# Patient Record
Sex: MALE | Race: WHITE | NOT HISPANIC OR LATINO | Employment: OTHER | URBAN - NONMETROPOLITAN AREA
[De-identification: names, ages, dates, MRNs, and addresses within clinical notes are randomized per-mention and may not be internally consistent; named-entity substitution may affect disease eponyms.]

---

## 2017-03-30 ENCOUNTER — APPOINTMENT (OUTPATIENT)
Dept: LAB | Facility: HOSPITAL | Age: 62
End: 2017-03-30
Attending: INTERNAL MEDICINE

## 2017-03-30 ENCOUNTER — TRANSCRIBE ORDERS (OUTPATIENT)
Dept: ADMINISTRATIVE | Facility: HOSPITAL | Age: 62
End: 2017-03-30

## 2017-03-30 DIAGNOSIS — M25.561 CHRONIC ARTHRALGIAS OF KNEES AND HIPS: ICD-10-CM

## 2017-03-30 DIAGNOSIS — G89.29 CHRONIC ARTHRALGIAS OF KNEES AND HIPS: ICD-10-CM

## 2017-03-30 DIAGNOSIS — R53.83 FATIGUE, UNSPECIFIED TYPE: Primary | ICD-10-CM

## 2017-03-30 DIAGNOSIS — M25.551 CHRONIC ARTHRALGIAS OF KNEES AND HIPS: ICD-10-CM

## 2017-03-30 DIAGNOSIS — M25.552 CHRONIC ARTHRALGIAS OF KNEES AND HIPS: ICD-10-CM

## 2017-03-30 DIAGNOSIS — M25.562 CHRONIC ARTHRALGIAS OF KNEES AND HIPS: ICD-10-CM

## 2017-03-30 LAB
ALBUMIN SERPL-MCNC: 4 G/DL (ref 3.5–5)
ALBUMIN/GLOB SERPL: 1.2 G/DL (ref 1.1–2.5)
ALP SERPL-CCNC: 53 U/L (ref 24–120)
ALT SERPL W P-5'-P-CCNC: 54 U/L (ref 0–54)
ANION GAP SERPL CALCULATED.3IONS-SCNC: 12 MMOL/L (ref 4–13)
AST SERPL-CCNC: 44 U/L (ref 7–45)
BILIRUB SERPL-MCNC: 0.5 MG/DL (ref 0.1–1)
BUN BLD-MCNC: 19 MG/DL (ref 5–21)
BUN/CREAT SERPL: 21.1 (ref 7–25)
CALCIUM SPEC-SCNC: 8.8 MG/DL (ref 8.4–10.4)
CHLORIDE SERPL-SCNC: 102 MMOL/L (ref 98–110)
CHROMATIN AB SERPL-ACNC: <8.6 IU/ML (ref 0–11.9)
CK SERPL-CCNC: 163 U/L (ref 0–203)
CO2 SERPL-SCNC: 27 MMOL/L (ref 24–31)
CREAT BLD-MCNC: 0.9 MG/DL (ref 0.5–1.4)
CRP SERPL-MCNC: 0.74 MG/DL (ref 0–0.99)
DEPRECATED RDW RBC AUTO: 45.1 FL (ref 40–54)
ERYTHROCYTE [DISTWIDTH] IN BLOOD BY AUTOMATED COUNT: 13.8 % (ref 12–15)
ERYTHROCYTE [SEDIMENTATION RATE] IN BLOOD: 25 MM/HR (ref 0–15)
FERRITIN SERPL-MCNC: 103 NG/ML (ref 17.9–464)
GFR SERPL CREATININE-BSD FRML MDRD: 85 ML/MIN/1.73
GLOBULIN UR ELPH-MCNC: 3.3 GM/DL
GLUCOSE BLD-MCNC: 89 MG/DL (ref 70–100)
HBV SURFACE AG SERPL QL IA: NEGATIVE
HCT VFR BLD AUTO: 39.1 % (ref 40–52)
HCV AB SER DONR QL: NEGATIVE
HCV S/C RATIO: 0.16 (ref 0–0.99)
HGB BLD-MCNC: 13.4 G/DL (ref 14–18)
HIV1+2 AB SER QL: NEGATIVE
MCH RBC QN AUTO: 30.6 PG (ref 28–32)
MCHC RBC AUTO-ENTMCNC: 34.3 G/DL (ref 33–36)
MCV RBC AUTO: 89.3 FL (ref 82–95)
PLATELET # BLD AUTO: 181 10*3/MM3 (ref 130–400)
PMV BLD AUTO: 13.3 FL (ref 6–12)
POTASSIUM BLD-SCNC: 3.8 MMOL/L (ref 3.5–5.3)
PROT SERPL-MCNC: 7.3 G/DL (ref 6.3–8.7)
RBC # BLD AUTO: 4.38 10*6/MM3 (ref 4.8–5.9)
SODIUM BLD-SCNC: 141 MMOL/L (ref 135–145)
WBC NRBC COR # BLD: 11.01 10*3/MM3 (ref 4.8–10.8)

## 2017-03-30 PROCEDURE — 86431 RHEUMATOID FACTOR QUANT: CPT | Performed by: INTERNAL MEDICINE

## 2017-03-30 PROCEDURE — 86140 C-REACTIVE PROTEIN: CPT | Performed by: INTERNAL MEDICINE

## 2017-03-30 PROCEDURE — 86803 HEPATITIS C AB TEST: CPT | Performed by: INTERNAL MEDICINE

## 2017-03-30 PROCEDURE — 80053 COMPREHEN METABOLIC PANEL: CPT | Performed by: INTERNAL MEDICINE

## 2017-03-30 PROCEDURE — 86618 LYME DISEASE ANTIBODY: CPT | Performed by: INTERNAL MEDICINE

## 2017-03-30 PROCEDURE — 36415 COLL VENOUS BLD VENIPUNCTURE: CPT | Performed by: INTERNAL MEDICINE

## 2017-03-30 PROCEDURE — 82728 ASSAY OF FERRITIN: CPT | Performed by: INTERNAL MEDICINE

## 2017-03-30 PROCEDURE — 82550 ASSAY OF CK (CPK): CPT | Performed by: INTERNAL MEDICINE

## 2017-03-30 PROCEDURE — 87340 HEPATITIS B SURFACE AG IA: CPT | Performed by: INTERNAL MEDICINE

## 2017-03-30 PROCEDURE — 85027 COMPLETE CBC AUTOMATED: CPT | Performed by: INTERNAL MEDICINE

## 2017-03-30 PROCEDURE — G0432 EIA HIV-1/HIV-2 SCREEN: HCPCS | Performed by: INTERNAL MEDICINE

## 2017-03-30 PROCEDURE — 85651 RBC SED RATE NONAUTOMATED: CPT | Performed by: INTERNAL MEDICINE

## 2017-03-31 LAB — B BURGDOR IGG+IGM SER-ACNC: <0.91 ISR (ref 0–0.9)

## 2017-08-04 ENCOUNTER — TRANSCRIBE ORDERS (OUTPATIENT)
Dept: ADMINISTRATIVE | Facility: HOSPITAL | Age: 62
End: 2017-08-04

## 2017-08-04 DIAGNOSIS — E11.9 DIABETES MELLITUS WITHOUT COMPLICATION (HCC): Primary | ICD-10-CM

## 2017-08-23 ENCOUNTER — LAB (OUTPATIENT)
Dept: ONCOLOGY | Facility: CLINIC | Age: 62
End: 2017-08-23

## 2017-08-23 ENCOUNTER — APPOINTMENT (OUTPATIENT)
Dept: LAB | Facility: HOSPITAL | Age: 62
End: 2017-08-23

## 2017-08-23 ENCOUNTER — CONSULT (OUTPATIENT)
Dept: ONCOLOGY | Facility: CLINIC | Age: 62
End: 2017-08-23

## 2017-08-23 ENCOUNTER — HOSPITAL ENCOUNTER (OUTPATIENT)
Dept: CT IMAGING | Facility: HOSPITAL | Age: 62
Discharge: HOME | End: 2017-08-23
Attending: INTERNAL MEDICINE | Admitting: INTERNAL MEDICINE

## 2017-08-23 ENCOUNTER — TELEPHONE (OUTPATIENT)
Dept: ONCOLOGY | Facility: CLINIC | Age: 62
End: 2017-08-23

## 2017-08-23 VITALS
HEIGHT: 70 IN | RESPIRATION RATE: 18 BRPM | DIASTOLIC BLOOD PRESSURE: 92 MMHG | HEART RATE: 95 BPM | SYSTOLIC BLOOD PRESSURE: 130 MMHG | BODY MASS INDEX: 36.79 KG/M2 | TEMPERATURE: 97.1 F | WEIGHT: 257 LBS | OXYGEN SATURATION: 94 %

## 2017-08-23 DIAGNOSIS — R22.1 NODULE OF NECK: ICD-10-CM

## 2017-08-23 DIAGNOSIS — D64.9 ANEMIA, UNSPECIFIED TYPE: ICD-10-CM

## 2017-08-23 DIAGNOSIS — D72.829 LEUKOCYTOSIS, UNSPECIFIED TYPE: ICD-10-CM

## 2017-08-23 DIAGNOSIS — D72.829 LEUKOCYTOSIS, UNSPECIFIED TYPE: Primary | ICD-10-CM

## 2017-08-23 LAB
ALBUMIN SERPL-MCNC: 4.5 G/DL (ref 3.5–5)
ALBUMIN/GLOB SERPL: 1.4 G/DL (ref 1.1–2.5)
ALP SERPL-CCNC: 64 U/L (ref 24–120)
ALT SERPL W P-5'-P-CCNC: 74 U/L (ref 0–54)
ANION GAP SERPL CALCULATED.3IONS-SCNC: 13 MMOL/L (ref 4–13)
AST SERPL-CCNC: 47 U/L (ref 7–45)
AUTO MIXED CELLS #: 0.6 10*3/MM3 (ref 0.1–1.5)
AUTO MIXED CELLS %: 5.4 % (ref 0.2–15.1)
BILIRUB SERPL-MCNC: 0.8 MG/DL (ref 0.1–1)
BUN BLD-MCNC: 17 MG/DL (ref 5–21)
BUN/CREAT SERPL: 16.3 (ref 7–25)
CALCIUM SPEC-SCNC: 9.5 MG/DL (ref 8.4–10.4)
CHLORIDE SERPL-SCNC: 100 MMOL/L (ref 98–110)
CO2 SERPL-SCNC: 25 MMOL/L (ref 24–31)
CREAT BLD-MCNC: 1.04 MG/DL (ref 0.5–1.4)
CREAT BLDA-MCNC: 1.1 MG/DL (ref 0.6–1.3)
CYTOLOGIST CVX/VAG CYTO: NORMAL
ERYTHROCYTE [DISTWIDTH] IN BLOOD BY AUTOMATED COUNT: 14.4 % (ref 11.5–14.5)
GFR SERPL CREATININE-BSD FRML MDRD: 72 ML/MIN/1.73
GLOBULIN UR ELPH-MCNC: 3.3 GM/DL
GLUCOSE BLD-MCNC: 321 MG/DL (ref 70–100)
HCT VFR BLD AUTO: 45.2 % (ref 42–52)
HGB BLD-MCNC: 15.2 G/DL (ref 14–18)
LYMPHOCYTES # BLD AUTO: 2.7 10*3/MM3 (ref 0.8–7)
LYMPHOCYTES NFR BLD AUTO: 25.6 % (ref 10–58.5)
MCH RBC QN AUTO: 31.5 PG (ref 27–31)
MCHC RBC AUTO-ENTMCNC: 33.6 G/DL (ref 33–37)
MCV RBC AUTO: 93.6 FL (ref 80–94)
NEUTROPHILS # BLD AUTO: 7.2 10*3/MM3 (ref 2–7.8)
NEUTROPHILS NFR BLD AUTO: 69 % (ref 37–92)
PATH INTERP BLD-IMP: NORMAL
PLATELET # BLD AUTO: 181 10*3/MM3 (ref 130–400)
PMV BLD AUTO: 0 FL (ref 6–12)
POTASSIUM BLD-SCNC: 3.8 MMOL/L (ref 3.5–5.3)
PROT SERPL-MCNC: 7.8 G/DL (ref 6.3–8.7)
RBC # BLD AUTO: 4.83 10*6/MM3 (ref 4.7–6.1)
SODIUM BLD-SCNC: 138 MMOL/L (ref 135–145)
WBC NRBC COR # BLD: 10.5 10*3/MM3 (ref 4.8–10.8)

## 2017-08-23 PROCEDURE — 80053 COMPREHEN METABOLIC PANEL: CPT | Performed by: INTERNAL MEDICINE

## 2017-08-23 PROCEDURE — 99204 OFFICE O/P NEW MOD 45 MIN: CPT | Performed by: INTERNAL MEDICINE

## 2017-08-23 PROCEDURE — 85025 COMPLETE CBC W/AUTO DIFF WBC: CPT | Performed by: INTERNAL MEDICINE

## 2017-08-23 PROCEDURE — 36415 COLL VENOUS BLD VENIPUNCTURE: CPT

## 2017-08-23 PROCEDURE — 70491 CT SOFT TISSUE NECK W/DYE: CPT

## 2017-08-23 PROCEDURE — 0 IOPAMIDOL 61 % SOLUTION: Performed by: INTERNAL MEDICINE

## 2017-08-23 PROCEDURE — 36415 COLL VENOUS BLD VENIPUNCTURE: CPT | Performed by: INTERNAL MEDICINE

## 2017-08-23 PROCEDURE — 82565 ASSAY OF CREATININE: CPT

## 2017-08-23 PROCEDURE — 85060 BLOOD SMEAR INTERPRETATION: CPT | Performed by: INTERNAL MEDICINE

## 2017-08-23 RX ORDER — INDAPAMIDE 1.25 MG/1
1.25 TABLET, FILM COATED ORAL EVERY MORNING
COMMUNITY
End: 2018-02-17 | Stop reason: HOSPADM

## 2017-08-23 RX ORDER — LISINOPRIL 40 MG/1
40 TABLET ORAL DAILY
Status: ON HOLD | COMMUNITY
End: 2018-02-17

## 2017-08-23 RX ORDER — SERTRALINE HYDROCHLORIDE 100 MG/1
100 TABLET, FILM COATED ORAL DAILY
COMMUNITY

## 2017-08-23 RX ORDER — GABAPENTIN 300 MG/1
300 CAPSULE ORAL 3 TIMES DAILY
COMMUNITY
End: 2022-04-26

## 2017-08-23 RX ORDER — LEVOTHYROXINE SODIUM 0.07 MG/1
75 TABLET ORAL DAILY
COMMUNITY

## 2017-08-23 RX ORDER — ATORVASTATIN CALCIUM 20 MG/1
20 TABLET, FILM COATED ORAL DAILY
COMMUNITY

## 2017-08-23 RX ADMIN — IOPAMIDOL 100 ML: 612 INJECTION, SOLUTION INTRAVENOUS at 13:15

## 2017-08-23 NOTE — TELEPHONE ENCOUNTER
Notified patient that liver enzymes are elevated and Dr. Prieto does not want him taking any nsaids or drinking alcohol at this time and her needs to see his PCP about his elevated glucose.  Patient verbalized understanding.

## 2017-08-23 NOTE — PROGRESS NOTES
[facility]  HEMATOLOGY & ONCOLOGY        Subjective     VISIT DIAGNOSIS:   Encounter Diagnoses   Name Primary?   • Leukocytosis, unspecified type Yes   • Nodule of neck        REASON FOR VISIT:     Chief Complaint   Patient presents with   • Leukocytosis     eval and treat        HEMATOLOGY / ONCOLOGY HISTORY:    No history exists.     [No treatment plan]  Cancer Staging Information:  No matching staging information was found for the patient.      INTERVAL HISTORY  Patient ID: Malvin Duggan is a 61 y.o. year old male         Review of Systems   Constitutional: Positive for chills, diaphoresis and fatigue. Negative for activity change, appetite change, fever and unexpected weight change.   HENT: Negative.    Eyes: Negative.    Respiratory: Negative.    Cardiovascular: Negative.    Gastrointestinal: Negative.    Endocrine: Negative.    Genitourinary: Negative.    Musculoskeletal: Negative.    Skin: Negative.    Allergic/Immunologic: Negative.    Neurological: Negative.    Hematological: Negative.    Psychiatric/Behavioral: Negative.             Medications:    Current Outpatient Prescriptions   Medication Sig Dispense Refill   • atorvastatin (LIPITOR) 20 MG tablet Take 20 mg by mouth Daily.     • gabapentin (NEURONTIN) 300 MG capsule Take 300 mg by mouth 3 (Three) Times a Day.     • indapamide (LOZOL) 1.25 MG tablet Take 1.25 mg by mouth Every Morning.     • levothyroxine (SYNTHROID, LEVOTHROID) 75 MCG tablet Take 75 mcg by mouth Daily.     • lisinopril (PRINIVIL,ZESTRIL) 40 MG tablet Take 40 mg by mouth Daily.     • metFORMIN (GLUCOPHAGE) 500 MG tablet Take 500 mg by mouth 3 (Three) Times a Day.     • sertraline (ZOLOFT) 100 MG tablet Take 100 mg by mouth Daily.       No current facility-administered medications for this visit.        ALLERGIES:    Allergies   Allergen Reactions   • Doxycycline Anaphylaxis       Objective      Vitals:    08/23/17 1103   BP: 130/92   Pulse: 95   Resp: 18   Temp:  97.1 °F (36.2 °C)   SpO2: 94%       Current Status 8/23/2017   ECOG score 0       General Appearance: Patient is awake, alert, oriented and in no acute distress. Patient is welldeveloped, wellnourished, and appears stated age.  HEENT: Normocephalic. Sclerae clear, conjunctiva pink, extraocular movements intact, pupils, round, reactive to light and  accommodation. Mouth and throat are clear with moist oral mucosa.  NECK: 1x1 cm nodule para midline left, soft, , no jugular venous distention, thyroid not enlarged.  LYMPH: + cervical,No  supraclavicular, axillary, or inguinal lymphadenopathy.  CHEST: Equal bilateral expansion, AP  diameter normal, resonant percussion note  LUNGS: Good air movement, no rales, rhonchi, rubs or wheezes with auscultation  CARDIO: Regular sinus rhythm, no murmurs, gallops or rubs.  ABDOMEN:Obese, Nondistended, soft, No tenderness, no guarding, no rebound, No hepatosplenomegaly. No abdominal masses. Bowel sounds positive. No hernia  GENITALIA: Not examined.  BREASTS: Not examined.  MUSKEL: No joint swelling, decreased motion, or inflammation  EXTREMS: No edema, clubbing, cyanosis, No varicose veins.  NEURO: Grossly nonfocal, Gait is coordinated and smooth, Cognition is preserved.  SKIN: No rashes, no ecchymoses, no petechia.  PSYCH: Oriented to time, place and person. Memory is preserved. Mood and affect appear normal      RECENT LABS:  Lab on 08/23/2017   Component Date Value Ref Range Status   • WBC 08/23/2017 10.50  4.80 - 10.80 10*3/mm3 Final   • RBC 08/23/2017 4.83  4.70 - 6.10 10*6/mm3 Final   • Hemoglobin 08/23/2017 15.2  14.0 - 18.0 g/dL Final   • Hematocrit 08/23/2017 45.2  42.0 - 52.0 % Final   • MCV 08/23/2017 93.6  80.0 - 94.0 fL Final   • MCH 08/23/2017 31.5* 27.0 - 31.0 pg Final   • MCHC 08/23/2017 33.6  33.0 - 37.0 g/dL Final   • RDW 08/23/2017 14.4  11.5 - 14.5 % Final   • MPV 08/23/2017 0.0* 6.0 - 12.0 fL Final   • Platelets 08/23/2017 181  130 - 400 10*3/mm3 Final   •  Neutrophil % 08/23/2017 69.0  37.0 - 92.0 % Final   • Lymphocyte % 08/23/2017 25.6  10.0 - 58.5 % Final   • Auto Mixed Cells % 08/23/2017 5.4  0.2 - 15.1 % Final   • Neutrophils, Absolute 08/23/2017 7.20  2.00 - 7.80 10*3/mm3 Final   • Lymphocytes, Absolute 08/23/2017 2.70  0.80 - 7.00 10*3/mm3 Final   • Auto Mixed Cells # 08/23/2017 0.60  0.10 - 1.50 10*3/mm3 Final       RADIOLOGY:  No results found.         Assessment/Plan 60-year-old male noted to have leukocytosis since April 2017, on exam noted the presence of cervical lymphadenopathy.  Combination leukocytosis with cervical lymphadenopathy is concerning. I am going to do extensive workup to figure out the cause of the leukocytosis.     We will recheck CBC, check peripheral smear, check flow cytometry, check B12, folate, iron panel, ferritin, obtain CT of the neck with and without contrast.  We will follow with patient in 2 weeks to review.    Time Spent: 60 minutes; greater than  50% of time was spent in patient counseling and care coordination.     Yi Prieto MD    8/23/2017    11:44 AM

## 2017-08-24 LAB
FERRITIN SERPL-MCNC: 99.3 NG/ML (ref 17.9–464)
FOLATE SERPL-MCNC: 10.4 NG/ML
IRON SATN MFR SERPL: 28 % (ref 20–45)
IRON SERPL-MCNC: 95 MCG/DL (ref 42–180)
TIBC SERPL-MCNC: 334 MCG/DL (ref 225–420)
UIBC SERPL-MCNC: 239 MCG/DL
VIT B12 SERPL-MCNC: 579 PG/ML (ref 239–931)

## 2017-08-25 LAB — COPPER SERPL-MCNC: 106 UG/DL (ref 72–166)

## 2017-08-29 ENCOUNTER — APPOINTMENT (OUTPATIENT)
Dept: NUTRITION | Facility: HOSPITAL | Age: 62
End: 2017-08-29

## 2017-09-05 DIAGNOSIS — D72.829 LEUKOCYTOSIS, UNSPECIFIED TYPE: Primary | ICD-10-CM

## 2017-09-12 ENCOUNTER — OFFICE VISIT (OUTPATIENT)
Dept: ONCOLOGY | Facility: CLINIC | Age: 62
End: 2017-09-12

## 2017-09-12 ENCOUNTER — LAB (OUTPATIENT)
Dept: LAB | Facility: HOSPITAL | Age: 62
End: 2017-09-12

## 2017-09-12 VITALS
WEIGHT: 254.1 LBS | BODY MASS INDEX: 36.38 KG/M2 | HEIGHT: 70 IN | SYSTOLIC BLOOD PRESSURE: 118 MMHG | RESPIRATION RATE: 16 BRPM | TEMPERATURE: 97.2 F | DIASTOLIC BLOOD PRESSURE: 74 MMHG | HEART RATE: 68 BPM | OXYGEN SATURATION: 97 %

## 2017-09-12 DIAGNOSIS — D72.829 LEUKOCYTOSIS, UNSPECIFIED TYPE: Primary | ICD-10-CM

## 2017-09-12 DIAGNOSIS — R22.1 NECK NODULE: Primary | ICD-10-CM

## 2017-09-12 DIAGNOSIS — D72.829 LEUKOCYTOSIS, UNSPECIFIED TYPE: ICD-10-CM

## 2017-09-12 LAB
ALBUMIN SERPL-MCNC: 4 G/DL (ref 3.5–5)
ALBUMIN/GLOB SERPL: 1.2 G/DL (ref 1.1–2.5)
ALP SERPL-CCNC: 55 U/L (ref 24–120)
ALT SERPL W P-5'-P-CCNC: 59 U/L (ref 0–54)
ANION GAP SERPL CALCULATED.3IONS-SCNC: 11 MMOL/L (ref 4–13)
AST SERPL-CCNC: 42 U/L (ref 7–45)
AUTO MIXED CELLS #: 0.6 10*3/MM3 (ref 0.1–2.6)
AUTO MIXED CELLS %: 6.4 % (ref 0.1–24)
BILIRUB SERPL-MCNC: 0.8 MG/DL (ref 0.1–1)
BUN BLD-MCNC: 19 MG/DL (ref 5–21)
BUN/CREAT SERPL: 23.2
CALCIUM SPEC-SCNC: 8.9 MG/DL (ref 8.4–10.4)
CHLORIDE SERPL-SCNC: 100 MMOL/L (ref 98–110)
CO2 SERPL-SCNC: 27 MMOL/L (ref 24–31)
CREAT BLD-MCNC: 0.82 MG/DL (ref 0.5–1.4)
ERYTHROCYTE [DISTWIDTH] IN BLOOD BY AUTOMATED COUNT: 13.8 % (ref 12–15)
GFR SERPL CREATININE-BSD FRML MDRD: 95 ML/MIN/1.73
GLOBULIN UR ELPH-MCNC: 3.3 GM/DL
GLUCOSE BLD-MCNC: 144 MG/DL (ref 70–100)
HCT VFR BLD AUTO: 39 % (ref 40–52)
HGB BLD-MCNC: 13.9 G/DL (ref 14–18)
HOLD SPECIMEN: NORMAL
LYMPHOCYTES # BLD AUTO: 2.5 10*3/MM3 (ref 0.8–7)
LYMPHOCYTES NFR BLD AUTO: 25.6 % (ref 15–45)
MCH RBC QN AUTO: 31.4 PG (ref 28–32)
MCHC RBC AUTO-ENTMCNC: 35.6 G/DL (ref 33–36)
MCV RBC AUTO: 88 FL (ref 82–95)
NEUTROPHILS # BLD AUTO: 6.8 10*3/MM3 (ref 1.5–8.3)
NEUTROPHILS NFR BLD AUTO: 68 % (ref 39–78)
PLATELET # BLD AUTO: 166 10*3/MM3 (ref 130–400)
PMV BLD AUTO: 12.3 FL (ref 6–12)
POTASSIUM BLD-SCNC: 4 MMOL/L (ref 3.5–5.3)
PROT SERPL-MCNC: 7.3 G/DL (ref 6.3–8.7)
RBC # BLD AUTO: 4.43 10*6/MM3 (ref 4.2–5.4)
SODIUM BLD-SCNC: 138 MMOL/L (ref 135–145)
WBC NRBC COR # BLD: 9.9 10*3/MM3 (ref 4.8–10.8)

## 2017-09-12 PROCEDURE — 85025 COMPLETE CBC W/AUTO DIFF WBC: CPT

## 2017-09-12 PROCEDURE — 80053 COMPREHEN METABOLIC PANEL: CPT

## 2017-09-12 PROCEDURE — 99213 OFFICE O/P EST LOW 20 MIN: CPT | Performed by: INTERNAL MEDICINE

## 2017-09-12 PROCEDURE — 36415 COLL VENOUS BLD VENIPUNCTURE: CPT

## 2017-09-12 NOTE — PROGRESS NOTES
[facility]  HEMATOLOGY & ONCOLOGY        Subjective     VISIT DIAGNOSIS:   Encounter Diagnosis   Name Primary?   • Leukocytosis, unspecified type Yes       REASON FOR VISIT:     Chief Complaint   Patient presents with   • Leukocytosis     Workup        HEMATOLOGY / ONCOLOGY HISTORY:   Oncology/Hematology History    60-year-old male referred to me for leukocytoses first noted by PCP in April.  White count was noted to be elevated at 12.0.   07/26/2017 white count was noted to be elevated at 10.9.  Hemoglobin 15.1, platelet 199 normal.    Patient denies that any lymphadenopathy.  Reports having night sweats for sure.  Last night and one time in the past.  Denies unintentional weight loss.  Denies fatigue or fever.  Denies early satiety.  Denies abdominal pain or organomegaly.  Denies use of steroids.  Denies being sick or having any infection.  Denies use of cigarettes.  Denies family history of leukemia or lymphoma.  He still has spleen.       [No treatment plan]  Cancer Staging Information:  No matching staging information was found for the patient.      INTERVAL HISTORY  Patient ID: Malvin Duggan is a 61 y.o. year old male yet to follow-up on leukocytosis.  We will obtain the flow cytometry and there is no evidence of monoclonal B-cell.  T-cell show no significant aberrant antigen expression and the CD4 CD8 ratio is normal.  Peripheral blood smear showed the neutrophilia no evidence of toxic granules or left shift no blasts are dysplastic Nevus cells noted.  Absence of schistocytes normal at 4 and platelets.  HIV tests are reactive.  He has normal iron profile, B12, folate, and ferritin.  I today's presentation history is fine no complaint whatsoever he just went on vacation.  He started exercising and eating healthier.  Also CT soft tissue neck was obtained due to some nodules felt on anterior cervical region.  It did not show any evidence of lymphadenopathy, normal thyroid.        Review of  Systems   Constitutional: Positive for chills, diaphoresis and fatigue. Negative for activity change, appetite change, fever and unexpected weight change.   HENT: Negative.    Eyes: Negative.    Respiratory: Negative.    Cardiovascular: Negative.    Gastrointestinal: Negative.    Endocrine: Negative.    Genitourinary: Negative.    Musculoskeletal: Negative.    Skin: Negative.    Allergic/Immunologic: Negative.    Neurological: Negative.    Hematological: Negative.    Psychiatric/Behavioral: Negative.             Medications:    Current Outpatient Prescriptions   Medication Sig Dispense Refill   • atorvastatin (LIPITOR) 20 MG tablet Take 20 mg by mouth Daily.     • gabapentin (NEURONTIN) 300 MG capsule Take 300 mg by mouth 3 (Three) Times a Day.     • indapamide (LOZOL) 1.25 MG tablet Take 1.25 mg by mouth Every Morning.     • levothyroxine (SYNTHROID, LEVOTHROID) 75 MCG tablet Take 75 mcg by mouth Daily.     • lisinopril (PRINIVIL,ZESTRIL) 40 MG tablet Take 40 mg by mouth Daily.     • metFORMIN (GLUCOPHAGE) 500 MG tablet Take 500 mg by mouth 3 (Three) Times a Day.     • sertraline (ZOLOFT) 100 MG tablet Take 100 mg by mouth Daily.       No current facility-administered medications for this visit.        ALLERGIES:    Allergies   Allergen Reactions   • Doxycycline Anaphylaxis and Swelling       Objective      Vitals:    09/12/17 0830   BP: 118/74   Pulse: 68   Resp: 16   Temp: 97.2 °F (36.2 °C)   SpO2: 97%       Current Status 9/12/2017   ECOG score 0       General Appearance: Patient is awake, alert, oriented and in no acute distress. Patient is welldeveloped, wellnourished, and appears stated age.  HEENT: Normocephalic. Sclerae clear, conjunctiva pink, extraocular movements intact, pupils, round, reactive to light and  accommodation. Mouth and throat are clear with moist oral mucosa.  NECK: 1x1 cm nodule para midline left, soft, , no jugular venous distention, thyroid not enlarged.  LYMPH: + cervical,No   supraclavicular, axillary, or inguinal lymphadenopathy.  CHEST: Equal bilateral expansion, AP  diameter normal, resonant percussion note  LUNGS: Good air movement, no rales, rhonchi, rubs or wheezes with auscultation  CARDIO: Regular sinus rhythm, no murmurs, gallops or rubs.  ABDOMEN:Obese, Nondistended, soft, No tenderness, no guarding, no rebound, No hepatosplenomegaly. No abdominal masses. Bowel sounds positive. No hernia  GENITALIA: Not examined.  BREASTS: Not examined.  MUSKEL: No joint swelling, decreased motion, or inflammation  EXTREMS: No edema, clubbing, cyanosis, No varicose veins.  NEURO: Grossly nonfocal, Gait is coordinated and smooth, Cognition is preserved.  SKIN: No rashes, no ecchymoses, no petechia.  PSYCH: Oriented to time, place and person. Memory is preserved. Mood and affect appear normal      RECENT LABS:  Lab on 09/12/2017   Component Date Value Ref Range Status   • Glucose 09/12/2017 144* 70 - 100 mg/dL Final   • BUN 09/12/2017 19  5 - 21 mg/dL Final   • Creatinine 09/12/2017 0.82  0.50 - 1.40 mg/dL Final   • Sodium 09/12/2017 138  135 - 145 mmol/L Final   • Potassium 09/12/2017 4.0  3.5 - 5.3 mmol/L Final   • Chloride 09/12/2017 100  98 - 110 mmol/L Final   • CO2 09/12/2017 27.0  24.0 - 31.0 mmol/L Final   • Calcium 09/12/2017 8.9  8.4 - 10.4 mg/dL Final   • Total Protein 09/12/2017 7.3  6.3 - 8.7 g/dL Final   • Albumin 09/12/2017 4.00  3.50 - 5.00 g/dL Final   • ALT (SGPT) 09/12/2017 59* 0 - 54 U/L Final   • AST (SGOT) 09/12/2017 42  7 - 45 U/L Final   • Alkaline Phosphatase 09/12/2017 55  24 - 120 U/L Final   • Total Bilirubin 09/12/2017 0.8  0.1 - 1.0 mg/dL Final   • eGFR Non African Amer 09/12/2017 95  >60 mL/min/1.73 Final   • Globulin 09/12/2017 3.3  gm/dL Final   • A/G Ratio 09/12/2017 1.2  1.1 - 2.5 g/dL Final   • BUN/Creatinine Ratio 09/12/2017 23.2    Final   • Anion Gap 09/12/2017 11.0  4.0 - 13.0 mmol/L Final   • WBC 09/12/2017 9.90  4.80 - 10.80 10*3/mm3 Final   • RBC  09/12/2017 4.43  4.20 - 5.40 10*6/mm3 Final   • Hemoglobin 09/12/2017 13.9* 14.0 - 18.0 g/dL Final   • Hematocrit 09/12/2017 39.0* 40.0 - 52.0 % Final   • MCV 09/12/2017 88.0  82.0 - 95.0 fL Final   • MCH 09/12/2017 31.4  28.0 - 32.0 pg Final   • MCHC 09/12/2017 35.6  33.0 - 36.0 g/dL Final   • RDW 09/12/2017 13.8  12.0 - 15.0 % Final   • MPV 09/12/2017 12.3* 6.0 - 12.0 fL Final   • Platelets 09/12/2017 166  130 - 400 10*3/mm3 Final   • Neutrophil % 09/12/2017 68.0  39.0 - 78.0 % Final   • Lymphocyte % 09/12/2017 25.6  15.0 - 45.0 % Final   • Auto Mixed Cells % 09/12/2017 6.4  0.1 - 24.0 % Final   • Neutrophils, Absolute 09/12/2017 6.80  1.50 - 8.30 10*3/mm3 Final   • Lymphocytes, Absolute 09/12/2017 2.50  0.80 - 7.00 10*3/mm3 Final   • Auto Mixed Cells # 09/12/2017 0.60  0.10 - 2.60 10*3/mm3 Final   • Extra Tube 09/12/2017 Hold for add-ons.   Final    Auto resulted.       RADIOLOGY:  Ct Soft Tissue Neck With Contrast    Result Date: 8/23/2017  Narrative: EXAMINATION:  CT SOFT TISSUE NECK W CONTRAST-  8/23/2017 12:41 PM CDT  HISTORY: Patient with leukocytosis and a 1x1 cm nodule on ventral neck. D72.829-Elevated white blood cell count, unspecified; R22.1-Localized swelling, mass and lump, neck.  COMPARISON: No comparison study.  TECHNIQUE: Spiral CT was performed of the neck with contrast. Sagittal and coronal images were reconstructed. DLP: 235 mGy-cm.  FINDINGS: The visualized lungs are clear. There is a BB marker on the right neck at the level of the upper aspect of the thyroid gland. There is normal-appearing sternocleidomastoid muscle deep to the marker. There are no other masses or lymph nodes in this area. The thyroid gland demonstrates no nodularity. The parotid and submandibular glands are symmetric. The nasopharynx, oropharynx hypopharynx and larynx demonstrate no soft tissue asymmetry or mass effect. The visualized brain is unremarkable. The visualized paranasal sinuses are clear. The globes, optic nerves  and ocular muscles are symmetric. There are degenerative changes of the cervical spine at multiple levels and most severe at see 5-6 and C6-7. 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      Impression: 1. There is no soft tissue nodule or lymph node at the palpable location on the right neck. In this area, there is a normal-appearing sternocleidomastoid muscle. The right thyroid lobe is nearby and there are no thyroid nodules identified. 2. No other acute findings. 3. Degenerative changes of the cervical spine. This report was finalized on 08/23/2017 13:05 by Dr. Adryan Callahan MD.           Assessment/Plan  60-year-old male noted to have leukocytosis since April 2017.    1.  Leukocytosis: Workup so far unrevealing, white count down to normal suspect this could be either some transient reactive process or due to obesity.  Patient has changed his lifestyle, he started exercising and eating healthy.  Reemphasized the importance of exercise and healthy eating with his history of diabetes.    2.  Transaminitis: Improved.  Patient advised to follow with his PCP.     At this point no hematologic intervention I will have him follow with PCP. He will be referred back to see us if things change at that point we could consider bone marrow biopsy.      Yi Prieto MD    9/12/2017    11:33 AM    Physical Exam

## 2018-02-15 ENCOUNTER — APPOINTMENT (OUTPATIENT)
Dept: CT IMAGING | Facility: HOSPITAL | Age: 63
End: 2018-02-15

## 2018-02-15 ENCOUNTER — HOSPITAL ENCOUNTER (INPATIENT)
Facility: HOSPITAL | Age: 63
LOS: 2 days | Discharge: HOME | End: 2018-02-17
Attending: EMERGENCY MEDICINE | Admitting: INTERNAL MEDICINE

## 2018-02-15 DIAGNOSIS — E66.9 DIABETES MELLITUS TYPE 2 IN OBESE (HCC): ICD-10-CM

## 2018-02-15 DIAGNOSIS — E11.69 DIABETES MELLITUS TYPE 2 IN OBESE (HCC): ICD-10-CM

## 2018-02-15 DIAGNOSIS — N17.9 ACUTE KIDNEY INJURY (NONTRAUMATIC) (HCC): Primary | ICD-10-CM

## 2018-02-15 LAB
ANION GAP SERPL CALCULATED.3IONS-SCNC: 17 MMOL/L (ref 4–13)
BACTERIA UR QL AUTO: ABNORMAL /HPF
BILIRUB UR QL STRIP: NEGATIVE
BUN BLD-MCNC: 50 MG/DL (ref 5–21)
BUN/CREAT SERPL: 13.9 (ref 7–25)
CALCIUM SPEC-SCNC: 9 MG/DL (ref 8.4–10.4)
CHLORIDE SERPL-SCNC: 97 MMOL/L (ref 98–110)
CLARITY UR: ABNORMAL
CO2 SERPL-SCNC: 25 MMOL/L (ref 24–31)
COD CRY URNS QL: ABNORMAL /HPF
COLOR UR: YELLOW
CREAT BLD-MCNC: 3.59 MG/DL (ref 0.5–1.4)
CREAT UR-MCNC: 96.1 MG/DL
DEPRECATED RDW RBC AUTO: 45.1 FL (ref 40–54)
EOSINOPHIL # BLD MANUAL: 0.13 10*3/MM3 (ref 0–0.7)
EOSINOPHIL NFR BLD MANUAL: 1 % (ref 0–4)
ERYTHROCYTE [DISTWIDTH] IN BLOOD BY AUTOMATED COUNT: 13.9 % (ref 12–15)
GFR SERPL CREATININE-BSD FRML MDRD: 17 ML/MIN/1.73
GLUCOSE BLD-MCNC: 113 MG/DL (ref 70–100)
GLUCOSE BLDC GLUCOMTR-MCNC: 103 MG/DL (ref 70–130)
GLUCOSE BLDC GLUCOMTR-MCNC: 182 MG/DL (ref 70–130)
GLUCOSE UR STRIP-MCNC: ABNORMAL MG/DL
HCT VFR BLD AUTO: 37.8 % (ref 40–52)
HGB BLD-MCNC: 12.7 G/DL (ref 14–18)
HGB UR QL STRIP.AUTO: NEGATIVE
HOLD SPECIMEN: NORMAL
HYALINE CASTS UR QL AUTO: ABNORMAL /LPF
KETONES UR QL STRIP: ABNORMAL
LEUKOCYTE ESTERASE UR QL STRIP.AUTO: ABNORMAL
LYMPHOCYTES # BLD MANUAL: 3.12 10*3/MM3 (ref 0.72–4.86)
LYMPHOCYTES NFR BLD MANUAL: 24 % (ref 15–45)
LYMPHOCYTES NFR BLD MANUAL: 7 % (ref 4–12)
MCH RBC QN AUTO: 30.1 PG (ref 28–32)
MCHC RBC AUTO-ENTMCNC: 33.6 G/DL (ref 33–36)
MCV RBC AUTO: 89.6 FL (ref 82–95)
MONOCYTES # BLD AUTO: 0.91 10*3/MM3 (ref 0.19–1.3)
MUCOUS THREADS URNS QL MICRO: ABNORMAL /HPF
NEUTROPHILS # BLD AUTO: 8.84 10*3/MM3 (ref 1.87–8.4)
NEUTROPHILS NFR BLD MANUAL: 68 % (ref 39–78)
NITRITE UR QL STRIP: NEGATIVE
NRBC BLD MANUAL-RTO: 0 /100 WBC (ref 0–0)
OSMOLALITY SERPL: 296 MOSM/KG (ref 289–308)
OSMOLALITY UR: 391 MOSM/KG (ref 601–850)
PH UR STRIP.AUTO: <=5 [PH] (ref 5–8)
PLATELET # BLD AUTO: 166 10*3/MM3 (ref 130–400)
PMV BLD AUTO: 12.8 FL (ref 6–12)
POTASSIUM BLD-SCNC: 4 MMOL/L (ref 3.5–5.3)
PROT UR QL STRIP: ABNORMAL
RBC # BLD AUTO: 4.22 10*6/MM3 (ref 4.8–5.9)
RBC # UR: ABNORMAL /HPF
RBC MORPH BLD: NORMAL
REF LAB TEST METHOD: ABNORMAL
SMALL PLATELETS BLD QL SMEAR: ADEQUATE
SODIUM BLD-SCNC: 139 MMOL/L (ref 135–145)
SODIUM UR-SCNC: 97 MMOL/L (ref 30–90)
SP GR UR STRIP: 1.02 (ref 1–1.03)
SQUAMOUS #/AREA URNS HPF: ABNORMAL /HPF
UROBILINOGEN UR QL STRIP: ABNORMAL
UUN 24H UR-MCNC: 387 MG/DL
WBC MORPH BLD: NORMAL
WBC NRBC COR # BLD: 13 10*3/MM3 (ref 4.8–10.8)
WBC UR QL AUTO: ABNORMAL /HPF
WHOLE BLOOD HOLD SPECIMEN: NORMAL

## 2018-02-15 PROCEDURE — 51798 US URINE CAPACITY MEASURE: CPT

## 2018-02-15 PROCEDURE — 82962 GLUCOSE BLOOD TEST: CPT

## 2018-02-15 PROCEDURE — 87086 URINE CULTURE/COLONY COUNT: CPT | Performed by: EMERGENCY MEDICINE

## 2018-02-15 PROCEDURE — 85025 COMPLETE CBC W/AUTO DIFF WBC: CPT | Performed by: EMERGENCY MEDICINE

## 2018-02-15 PROCEDURE — 74176 CT ABD & PELVIS W/O CONTRAST: CPT

## 2018-02-15 PROCEDURE — 83935 ASSAY OF URINE OSMOLALITY: CPT | Performed by: NURSE PRACTITIONER

## 2018-02-15 PROCEDURE — 81001 URINALYSIS AUTO W/SCOPE: CPT | Performed by: EMERGENCY MEDICINE

## 2018-02-15 PROCEDURE — 84540 ASSAY OF URINE/UREA-N: CPT | Performed by: NURSE PRACTITIONER

## 2018-02-15 PROCEDURE — 84300 ASSAY OF URINE SODIUM: CPT | Performed by: NURSE PRACTITIONER

## 2018-02-15 PROCEDURE — 63710000001 INSULIN LISPRO (HUMAN) PER 5 UNITS: Performed by: NURSE PRACTITIONER

## 2018-02-15 PROCEDURE — 83930 ASSAY OF BLOOD OSMOLALITY: CPT | Performed by: NURSE PRACTITIONER

## 2018-02-15 PROCEDURE — G0378 HOSPITAL OBSERVATION PER HR: HCPCS

## 2018-02-15 PROCEDURE — 99284 EMERGENCY DEPT VISIT MOD MDM: CPT

## 2018-02-15 PROCEDURE — 80048 BASIC METABOLIC PNL TOTAL CA: CPT | Performed by: EMERGENCY MEDICINE

## 2018-02-15 PROCEDURE — 82570 ASSAY OF URINE CREATININE: CPT | Performed by: NURSE PRACTITIONER

## 2018-02-15 PROCEDURE — 85007 BL SMEAR W/DIFF WBC COUNT: CPT | Performed by: EMERGENCY MEDICINE

## 2018-02-15 RX ORDER — LEVOTHYROXINE SODIUM 0.07 MG/1
75 TABLET ORAL
Status: DISCONTINUED | OUTPATIENT
Start: 2018-02-15 | End: 2018-02-17 | Stop reason: HOSPADM

## 2018-02-15 RX ORDER — FAMOTIDINE 20 MG/1
40 TABLET, FILM COATED ORAL DAILY
Status: DISCONTINUED | OUTPATIENT
Start: 2018-02-15 | End: 2018-02-17 | Stop reason: HOSPADM

## 2018-02-15 RX ORDER — ACETAMINOPHEN 325 MG/1
650 TABLET ORAL EVERY 4 HOURS PRN
Status: DISCONTINUED | OUTPATIENT
Start: 2018-02-15 | End: 2018-02-17 | Stop reason: HOSPADM

## 2018-02-15 RX ORDER — ATORVASTATIN CALCIUM 10 MG/1
20 TABLET, FILM COATED ORAL DAILY
Status: DISCONTINUED | OUTPATIENT
Start: 2018-02-16 | End: 2018-02-17 | Stop reason: HOSPADM

## 2018-02-15 RX ORDER — ONDANSETRON 2 MG/ML
4 INJECTION INTRAMUSCULAR; INTRAVENOUS EVERY 6 HOURS PRN
Status: DISCONTINUED | OUTPATIENT
Start: 2018-02-15 | End: 2018-02-17 | Stop reason: HOSPADM

## 2018-02-15 RX ORDER — ONDANSETRON 4 MG/1
4 TABLET, FILM COATED ORAL EVERY 6 HOURS PRN
Status: DISCONTINUED | OUTPATIENT
Start: 2018-02-15 | End: 2018-02-17 | Stop reason: HOSPADM

## 2018-02-15 RX ORDER — SODIUM CHLORIDE 0.9 % (FLUSH) 0.9 %
10 SYRINGE (ML) INJECTION AS NEEDED
Status: DISCONTINUED | OUTPATIENT
Start: 2018-02-15 | End: 2018-02-15

## 2018-02-15 RX ORDER — SERTRALINE HYDROCHLORIDE 100 MG/1
100 TABLET, FILM COATED ORAL DAILY
Status: DISCONTINUED | OUTPATIENT
Start: 2018-02-15 | End: 2018-02-17 | Stop reason: HOSPADM

## 2018-02-15 RX ORDER — NICOTINE POLACRILEX 4 MG
15 LOZENGE BUCCAL
Status: DISCONTINUED | OUTPATIENT
Start: 2018-02-15 | End: 2018-02-17 | Stop reason: HOSPADM

## 2018-02-15 RX ORDER — ONDANSETRON 4 MG/1
4 TABLET, ORALLY DISINTEGRATING ORAL EVERY 6 HOURS PRN
Status: DISCONTINUED | OUTPATIENT
Start: 2018-02-15 | End: 2018-02-17 | Stop reason: HOSPADM

## 2018-02-15 RX ORDER — SODIUM CHLORIDE 9 MG/ML
75 INJECTION, SOLUTION INTRAVENOUS CONTINUOUS
Status: DISCONTINUED | OUTPATIENT
Start: 2018-02-15 | End: 2018-02-17 | Stop reason: HOSPADM

## 2018-02-15 RX ORDER — ATORVASTATIN CALCIUM 10 MG/1
20 TABLET, FILM COATED ORAL NIGHTLY
Status: DISCONTINUED | OUTPATIENT
Start: 2018-02-15 | End: 2018-02-15

## 2018-02-15 RX ORDER — DEXTROSE MONOHYDRATE 25 G/50ML
25 INJECTION, SOLUTION INTRAVENOUS
Status: DISCONTINUED | OUTPATIENT
Start: 2018-02-15 | End: 2018-02-17 | Stop reason: HOSPADM

## 2018-02-15 RX ADMIN — INSULIN LISPRO 2 UNITS: 100 INJECTION, SOLUTION INTRAVENOUS; SUBCUTANEOUS at 21:05

## 2018-02-15 RX ADMIN — SODIUM CHLORIDE 150 ML/HR: 9 INJECTION, SOLUTION INTRAVENOUS at 18:44

## 2018-02-15 RX ADMIN — FAMOTIDINE 40 MG: 20 TABLET, FILM COATED ORAL at 18:43

## 2018-02-15 RX ADMIN — SODIUM CHLORIDE 1000 ML: 9 INJECTION, SOLUTION INTRAVENOUS at 15:29

## 2018-02-15 NOTE — ED PROVIDER NOTES
Subjective   HPI Comments: Patient comes in with a complaint that he has not urinated since last night in the meantime had multiple cup of coffee cups of coffee, Sprite, other carbonated drinks and water.  Patient does not feel any urge to urinate and does not complain of any abdominal distention.  Patient has some low back pain and vomited once.  Patient is complaining of feverish sensation although he does not have any fever here and has not taken any medication.  Patient called his physician and they were advised to come to the emergency department for further evaluation and treatment.  Patient denies any history of prostate problem although he is diabetic.  Patient the denies any other chronic illnesses other than the mentioned in patient's medical records    Patient is a 62 y.o. male presenting with difficulty urinating.   History provided by:  Patient and spouse  Difficulty Urinating   Presenting symptoms: no dysuria, no penile discharge, no penile pain, no scrotal pain and no swelling    Context: spontaneously    Context: not after injury, not after intercourse, not after urination, not during intercourse and not during urination    Relieved by:  Nothing  Worsened by:  Nothing  Ineffective treatments:  None tried  Associated symptoms: nausea, urinary retention and vomiting    Associated symptoms: no abdominal pain, no diarrhea, no fever, no flank pain, no genital itching, no genital lesions, no genital rash, no groin pain, no hematuria, no penile redness, no penile swelling, no priapism, no scrotal swelling, no urinary frequency, no urinary hesitation and no urinary incontinence    Risk factors: no bladder surgery, no change in medication, no erectile dysfunction, no foreign body, no HIV, no kidney stones, does not have multiple sexual partners, no new sexual partner, no recent infection, not currently sexually active, no sickle cell disease, no STI exposure, no unprotected sex and no urinary catheter         Review of Systems   Constitutional: Negative for fever.   Gastrointestinal: Positive for nausea and vomiting. Negative for abdominal pain and diarrhea.   Genitourinary: Positive for difficulty urinating. Negative for bladder incontinence, discharge, dysuria, flank pain, frequency, hematuria, hesitancy, penile pain, penile swelling and scrotal swelling.   All other systems reviewed and are negative.      Past Medical History:   Diagnosis Date   • Black lung disease    • Diabetes mellitus     Type 2   • Disease of thyroid gland    • GERD (gastroesophageal reflux disease)    • Hypertension    • Leukocytosis 8/23/2017   • Neck nodule 8/23/2017       Allergies   Allergen Reactions   • Doxycycline Anaphylaxis and Swelling       Past Surgical History:   Procedure Laterality Date   • COLON SURGERY      colon resection & polyp removed   • ELBOW ARTHROSCOPY     • SHOULDER ARTHROTOMY     • THUMB ARTHROSCOPY         History reviewed. No pertinent family history.    Social History     Social History   • Marital status:      Spouse name: N/A   • Number of children: N/A   • Years of education: N/A     Social History Main Topics   • Smoking status: Never Smoker   • Smokeless tobacco: None   • Alcohol use No   • Drug use: No   • Sexual activity: Defer     Other Topics Concern   • None     Social History Narrative           Objective   Physical Exam   Constitutional: He is oriented to person, place, and time. He appears well-developed and well-nourished. No distress.   HENT:   Head: Normocephalic and atraumatic.   Eyes: Conjunctivae are normal. Pupils are equal, round, and reactive to light.   Neck: Neck supple.   Cardiovascular: Normal rate, regular rhythm, normal heart sounds and intact distal pulses.    Pulmonary/Chest: Effort normal and breath sounds normal. No respiratory distress.   Abdominal: Soft. Bowel sounds are normal. He exhibits no distension. There is no tenderness.   Genitourinary: Penis normal.    Musculoskeletal: Normal range of motion. He exhibits no edema.   Neurological: He is alert and oriented to person, place, and time.   Skin: Skin is warm and dry. No rash noted. No erythema.   Psychiatric: He has a normal mood and affect.   Nursing note and vitals reviewed.      Procedures         ED Course  ED Course        Labs Reviewed   BASIC METABOLIC PANEL - Abnormal; Notable for the following:        Result Value    Glucose 113 (*)     BUN 50 (*)     Creatinine 3.59 (*)     Chloride 97 (*)     eGFR Non African Amer 17 (*)     Anion Gap 17.0 (*)     All other components within normal limits    Narrative:     GFR Normal >60  Chronic Kidney Disease <60  Kidney Failure <15   URINALYSIS W/ CULTURE IF INDICATED - Abnormal; Notable for the following:     Appearance, UA Cloudy (*)     Glucose, UA >=1000 mg/dL (3+) (*)     Ketones, UA Trace (*)     Protein, UA Trace (*)     Leuk Esterase, UA Trace (*)     All other components within normal limits   CBC WITH AUTO DIFFERENTIAL - Abnormal; Notable for the following:     WBC 13.00 (*)     RBC 4.22 (*)     Hemoglobin 12.7 (*)     Hematocrit 37.8 (*)     MPV 12.8 (*)     All other components within normal limits    Narrative:     ckd   URINALYSIS, MICROSCOPIC ONLY - Abnormal; Notable for the following:     WBC, UA 0-2 (*)     All other components within normal limits   MANUAL DIFFERENTIAL - Abnormal; Notable for the following:     Neutrophils Absolute 8.84 (*)     All other components within normal limits   URINE CULTURE   RAINBOW DRAW    Narrative:     The following orders were created for panel order [place] Draw.  Procedure                               Abnormality         Status                     ---------                               -----------         ------                     Light Blue Top[[id number]]                                   Final result               Red Top[[id number]]                                          Final result                 Please view  results for these tests on the individual orders.   CBC AND DIFFERENTIAL    Narrative:     The following orders were created for panel order CBC & Differential.  Procedure                               Abnormality         Status                     ---------                               -----------         ------                     Manual Differential[[id number]]                                                         CBC Auto Differential[[id number]]        Abnormal            Final result                 Please view results for these tests on the individual orders.   LIGHT BLUE TOP   RED TOP       CT Abdomen Pelvis Without Contrast   Final Result   1. No urinary tract calculi or obstructive uropathy changes.   2. No CT evidence of acute intra-abdominal/pelvic pathological process.   This report was finalized on 02/15/2018 15:55 by Dr. Amish Eastman MD.                  [facility]    Final diagnoses:   Acute kidney injury (nontraumatic)            Delfin Asher MD  02/15/18 0681

## 2018-02-15 NOTE — H&P
[facility] Medicine Services  HISTORY AND PHYSICAL    Date of Admission: 2/15/2018  Primary Care Physician: Keanu Aguiar DO    Subjective     Chief Complaint: Difficulty urinating    History of Present Illness  Mr. Duggan is a 62-year-old  male who follows Dr. Eliezer Bruner for primary care.  He has a past medical history significant for hypertension, hyperlipidemia, hypothyroidism, gastroesophageal reflux disease, and diabetes mellitus type II.  The patient states that yesterday afternoon he was feeling the urge to void but was unable to void- and prior to this the last time he was able to void was Tuesday night.  He also relates intermittent low back pain, described as aching, for the last 2-3 days.  He denies any dysuria, frequency, or urgency.  He also experienced one episode of vomiting last night.  He described his emesis as nonbloody, and nonbilious.  This was described as clear with undigested food.  He has had no difficulty with nausea or vomiting today, and has tolerated regular food.  His wife states that she did take his temperature last night, and the highest reached was 99.7.  He states that yesterday he did have a period for a few hours where he felt very fatigued, almost drowsy.  The patient states that he has never had any episodes or problems like this before.    The patient does tell me that around 3 months ago he was started on Jardiance, but that is his only recent medication change.    Review of Systems   Constitutional: Positive for fatigue and fever. Negative for appetite change and diaphoresis.   Respiratory: Negative for cough, chest tightness and shortness of breath.    Cardiovascular: Negative for chest pain, palpitations and leg swelling.   Gastrointestinal: Positive for nausea and vomiting. Negative for abdominal distention, abdominal pain, blood in stool, constipation and diarrhea.   Genitourinary: Positive for difficulty urinating.  Negative for dysuria, flank pain, frequency and urgency.   Musculoskeletal: Positive for back pain.   Neurological: Negative for dizziness, syncope, weakness, light-headedness and numbness.     Otherwise complete ROS reviewed and negative except as mentioned in the HPI.    Past Medical History:   Past Medical History:   Diagnosis Date   • Black lung disease    • Diabetes mellitus     Type 2   • Disease of thyroid gland    • GERD (gastroesophageal reflux disease)    • Hypertension    • Leukocytosis 2017   • Neck nodule 2017     Past Surgical History:  Past Surgical History:   Procedure Laterality Date   • COLON SURGERY      colon resection & polyp removed   • ELBOW ARTHROSCOPY     • SHOULDER ARTHROTOMY     • THUMB ARTHROSCOPY       Social History: The patient reports that he has never been a smoker.  He does drink alcohol, only socially, approximately 1-2 times per month.  He denies any illicit drug use.  He is , and has several adopted children.  He is a retired mine worker.    Family History: The patient reports both his mother and father are  from esophageal cancer.  He does have a sister with heart disease who is status post CABG.    Allergies:  Allergies   Allergen Reactions   • Doxycycline Anaphylaxis and Swelling     Medications:  Prior to Admission medications    Medication Sig Start Date End Date Taking? Authorizing Provider   atorvastatin (LIPITOR) 20 MG tablet Take 20 mg by mouth Daily.   Yes Historical Provider, MD   Empagliflozin (JARDIANCE) 25 MG tablet Take 25 mg by mouth Daily. 17  Yes Historical Provider, MD   gabapentin (NEURONTIN) 300 MG capsule Take 300 mg by mouth 3 (Three) Times a Day.   Yes Historical Provider, MD   indapamide (LOZOL) 1.25 MG tablet Take 1.25 mg by mouth Every Morning.   Yes Historical Provider, MD   levothyroxine (SYNTHROID, LEVOTHROID) 75 MCG tablet Take 75 mcg by mouth Daily.   Yes Historical Provider, MD   lisinopril (PRINIVIL,ZESTRIL) 40 MG  "tablet Take 40 mg by mouth Daily.   Yes Historical Provider, MD   metFORMIN (GLUCOPHAGE) 500 MG tablet Take 500 mg by mouth 3 (Three) Times a Day.   Yes Historical Provider, MD   sertraline (ZOLOFT) 100 MG tablet Take 100 mg by mouth Daily.   Yes Historical Provider, MD     Objective     Vital Signs: BP 95/59 (BP Location: Right arm, Patient Position: Lying) Comment: nurse notified  Pulse 79  Temp 98.5 °F (36.9 °C) (Oral)   Resp 16  Ht 177.8 cm (70\")  Wt 113 kg (250 lb 1.6 oz)  SpO2 94%  BMI 35.89 kg/m2  Physical Exam   Constitutional: He is oriented to person, place, and time. He appears well-developed and well-nourished. No distress.   HENT:   Head: Normocephalic and atraumatic.   Neck: Normal range of motion. Neck supple.   Cardiovascular: Normal rate, regular rhythm, normal heart sounds and intact distal pulses.  Exam reveals no gallop and no friction rub.    No murmur heard.  Pulmonary/Chest: Effort normal and breath sounds normal. He has no wheezes. He has no rales.   Abdominal: Soft. Bowel sounds are normal. He exhibits no distension. There is no tenderness.   Musculoskeletal: Normal range of motion. He exhibits no edema.   Neurological: He is alert and oriented to person, place, and time.   Skin: Skin is warm and dry.   Psychiatric: He has a normal mood and affect. His behavior is normal. Judgment and thought content normal.   Vitals reviewed.    Results Reviewed:  Lab Results (last 24 hours)     Procedure Component Value Units Date/Time    CBC & Differential [[id number]] Collected:  02/15/18 1458    Specimen:  Blood Updated:  02/15/18 1515    Narrative:       The following orders were created for panel order CBC & Differential.  Procedure                               Abnormality         Status                     ---------                               -----------         ------                     Manual Differential[[id number]]                                                         CBC Auto " Differential[[id number]]        Abnormal            Final result                 Please view results for these tests on the individual orders.    CBC Auto Differential [[id number]]  (Abnormal) Collected:  02/15/18 1458    Specimen:  Blood Updated:  02/15/18 1515     WBC 13.00 (H) 10*3/mm3      RBC 4.22 (L) 10*6/mm3      Hemoglobin 12.7 (L) g/dL      Hematocrit 37.8 (L) %      MCV 89.6 fL      MCH 30.1 pg      MCHC 33.6 g/dL      RDW 13.9 %      RDW-SD 45.1 fl      MPV 12.8 (H) fL      Platelets 166 10*3/mm3      nRBC 0.0 /100 WBC     Narrative:       ckd    Manual Differential [[id number]]  (Abnormal) Collected:  02/15/18 1458    Specimen:  Blood Updated:  02/15/18 1515     Neutrophil % 68.0 %      Lymphocyte % 24.0 %      Monocyte % 7.0 %      Eosinophil % 1.0 %      Neutrophils Absolute 8.84 (H) 10*3/mm3      Lymphocytes Absolute 3.12 10*3/mm3      Monocytes Absolute 0.91 10*3/mm3      Eosinophils Absolute 0.13 10*3/mm3      RBC Morphology Normal     WBC Morphology Normal     Platelet Estimate Adequate    Basic Metabolic Panel [[id number]]  (Abnormal) Collected:  02/15/18 1458    Specimen:  Blood Updated:  02/15/18 1519     Glucose 113 (H) mg/dL      BUN 50 (H) mg/dL      Creatinine 3.59 (H) mg/dL      Sodium 139 mmol/L      Potassium 4.0 mmol/L      Chloride 97 (L) mmol/L      CO2 25.0 mmol/L      Calcium 9.0 mg/dL      eGFR Non African Amer 17 (L) mL/min/1.73      BUN/Creatinine Ratio 13.9     Anion Gap 17.0 (H) mmol/L     Narrative:       GFR Normal >60  Chronic Kidney Disease <60  Kidney Failure <15    Urine Culture - Urine, Urine, Clean Catch [[id number]] Collected:  02/15/18 1504    Specimen:  Urine from Urine, Clean Catch Updated:  02/15/18 1521    Urinalysis With / Culture If Indicated - Urine, Clean Catch [[id number]]  (Abnormal) Collected:  02/15/18 1504    Specimen:  Urine from Urine, Clean Catch Updated:  02/15/18 1533     Color, UA Yellow     Appearance, UA Cloudy (A)     pH, UA <=5.0     Specific Gravity,  UA 1.024     Glucose, UA >=1000 mg/dL (3+) (A)     Ketones, UA Trace (A)     Bilirubin, UA Negative     Blood, UA Negative     Protein, UA Trace (A)     Leuk Esterase, UA Trace (A)     Nitrite, UA Negative     Urobilinogen, UA 0.2 E.U./dL    Urinalysis, Microscopic Only - Urine, Clean Catch [[id number]]  (Abnormal) Collected:  02/15/18 1504    Specimen:  Urine from Urine, Clean Catch Updated:  02/15/18 1533     RBC, UA None Seen /HPF      WBC, UA 0-2 (A) /HPF      Bacteria, UA None Seen /HPF      Squamous Epithelial Cells, UA 0-2 /HPF      Hyaline Casts, UA None Seen /LPF      Calcium Oxalate Crystals, UA Small/1+ /HPF      Mucus, UA Trace /HPF      Methodology Manual Light Microscopy    Red Top [[id number]] Collected:  02/15/18 1458    Specimen:  Blood Updated:  02/15/18 1601     Extra Tube Hold for add-ons.      Auto resulted.       Farrell Draw [[id number]] Collected:  02/15/18 1458    Specimen:  Blood Updated:  02/15/18 1601    Narrative:       The following orders were created for panel order Farrell Draw.  Procedure                               Abnormality         Status                     ---------                               -----------         ------                     Light Blue Top[[id number]]                                   Final result               Red Top[[id number]]                                          Final result                 Please view results for these tests on the individual orders.    Light Blue Top [[id number]] Collected:  02/15/18 1458    Specimen:  Blood Updated:  02/15/18 1601     Extra Tube hold for add-on      Auto resulted           Imaging Results (last 24 hours)     Procedure Component Value Units Date/Time    CT Abdomen Pelvis Without Contrast [[id number]] Collected:  02/15/18 1550     Updated:  02/15/18 1558    Narrative:       EXAMINATION: CT ABDOMEN PELVIS WO CONTRAST-  2/15/2018 3:50 PM CST     HISTORY: Bilateral flank pain     COMPARISON:None     TECHNIQUE:  Radiation  dose equals DLP blank mGy-cm.  Automated exposure  control dose reduction technique was implemented.     Thin section axial imaging was obtained. 2-D sagittal and coronal  reconstruction images were generated.     Intravenous contrast was not administered.     Oral contrast was not ingested.     FINDINGS:     There are no urinary tract calculi. There is no pelvocaliectasis or  hydroureter or obstructive uropathy changes. The urinary bladder is  minimally distended without focal bladder wall abnormality. Prostate  gland is not enlarged.     The lung bases are grossly clear.     There is no CT evidence of gallstones. There are no focal hepatic  lesions.     The spleen is not enlarged.     There are no adrenal masses or pancreatic lesions.     Changes from right hemicolectomy observed. Stool and gas appreciated in  the colon which is not dilated. There is no diverticular changes. The  small bowel is not dilated.     The duodenal sweep imaged appropriately. The stomach is not distended.     A retroaortic left renal vein is identified, normal variant.     There is mild atherosclerotic aortoiliac calcifications.     There are no pathologically enlarged lymph nodes.     Spondylosis changes noted in the thoracolumbar spine.     Small fat-containing periumbilical hernia changes noted without bowel  involvement.       Impression:       1. No urinary tract calculi or obstructive uropathy changes.  2. No CT evidence of acute intra-abdominal/pelvic pathological process.  This report was finalized on 02/15/2018 15:55 by Dr. Amish Eastman MD.        I have personally reviewed and interpreted the radiology studies and ECG obtained at time of admission.     Assessment / Plan   Assessment:   1.  Acute kidney injury, without previous history of known kidney disease. Creatinine in September 2017 was 0.82  2.  Leukocytosis  3.  Normocytic anemia  4.  Non-insulin dependent Diabetes mellitus type II  5.  History of Hypertension, slightly  hypotensive since admission  6.  Hyperlipidemia  7.  Hypothyroidism  8.  Obesity, BMI 35.9    Plan:   1.  Admit to [facility] floor under observation status   2.  Resume home medications as appropriate, hold lisinopril, indapamide, and oral diabetic medications in the setting of acute kidney injury  3.  Check renal ultrasound  4.  Check serum/urine osmolality, urine sodium, urine urea nitrogen, urine creatinine  5.  Normal saline at 150 mL/hr  6.  Check post void residuals. No abnormalities of the prostate seen on CT and patient has been able to void 400 ml since arrival to floor  7.  Accu-Checks and sliding scale insulin  8.  CHITO hose /SCD's for venous thromboembolism prophylaxis  9.  Labs in AM- CBC, CMP, Hgb A1c, TSH  10.  Hold off on antibiotics for the moment. Patient is currently afebrile and symptoms are improving since arrival. Urine culture has been sent    Code Status: Full. In the event that the patient is not able to speak for himself he would like his wife, Patti Mcgee, to make decisions for him.     I discussed the patients findings and my recommendations with the patient, his wife Patti, and Dr. Carvalho    Estimated length of stay 2-3 days    JOSUÉ Lea   02/15/18   5:31 PM     I personally evaluated and examined the patient in conjunction with JOSUÉ Hernandez and agree with the assessment, treatment plan, and disposition of the patient as recorded by her. My history, exam, and further recommendations are:     Up in bed.  No distress.  Seen and discussed with his wife.    He was recently started on Jardiance as an outpatient.  He also takes lisinopril and metformin.    Admitted for hydration and further workup.  Agree with checking another urinalysis and spot electrolytes.  Baseline renal ultrasound, even though the CT of the abdomen and pelvis failed to show any abnormalities of the prostate or collecting systems.    Hold antihypertensives and oral hypoglycemics.  Jardiance is known to  cause acute kidney injury and this medication has been being titrated as an outpatient.    Hemoglobin A1c and TSH pending.    Nothing to suggest infection.    Davonte Carvalho,   02/15/18  6:30 PM

## 2018-02-16 ENCOUNTER — APPOINTMENT (OUTPATIENT)
Dept: ULTRASOUND IMAGING | Facility: HOSPITAL | Age: 63
End: 2018-02-16

## 2018-02-16 LAB
ALBUMIN SERPL-MCNC: 3.5 G/DL (ref 3.5–5)
ALBUMIN/GLOB SERPL: 1.2 G/DL (ref 1.1–2.5)
ALP SERPL-CCNC: 50 U/L (ref 24–120)
ALT SERPL W P-5'-P-CCNC: 45 U/L (ref 0–54)
ANION GAP SERPL CALCULATED.3IONS-SCNC: 11 MMOL/L (ref 4–13)
AST SERPL-CCNC: 30 U/L (ref 7–45)
BASOPHILS # BLD AUTO: 0.05 10*3/MM3 (ref 0–0.2)
BASOPHILS NFR BLD AUTO: 0.5 % (ref 0–2)
BILIRUB SERPL-MCNC: 0.8 MG/DL (ref 0.1–1)
BUN BLD-MCNC: 40 MG/DL (ref 5–21)
BUN/CREAT SERPL: 20.8 (ref 7–25)
CALCIUM SPEC-SCNC: 8.7 MG/DL (ref 8.4–10.4)
CHLORIDE SERPL-SCNC: 105 MMOL/L (ref 98–110)
CO2 SERPL-SCNC: 26 MMOL/L (ref 24–31)
CREAT BLD-MCNC: 1.92 MG/DL (ref 0.5–1.4)
DEPRECATED RDW RBC AUTO: 44.2 FL (ref 40–54)
EOSINOPHIL # BLD AUTO: 0.13 10*3/MM3 (ref 0–0.7)
EOSINOPHIL NFR BLD AUTO: 1.3 % (ref 0–4)
ERYTHROCYTE [DISTWIDTH] IN BLOOD BY AUTOMATED COUNT: 13.8 % (ref 12–15)
GFR SERPL CREATININE-BSD FRML MDRD: 35 ML/MIN/1.73
GLOBULIN UR ELPH-MCNC: 3 GM/DL
GLUCOSE BLD-MCNC: 138 MG/DL (ref 70–100)
GLUCOSE BLDC GLUCOMTR-MCNC: 118 MG/DL (ref 70–130)
GLUCOSE BLDC GLUCOMTR-MCNC: 141 MG/DL (ref 70–130)
GLUCOSE BLDC GLUCOMTR-MCNC: 143 MG/DL (ref 70–130)
GLUCOSE BLDC GLUCOMTR-MCNC: 164 MG/DL (ref 70–130)
HBA1C MFR BLD: 8.1 %
HCT VFR BLD AUTO: 36.4 % (ref 40–52)
HGB BLD-MCNC: 12.6 G/DL (ref 14–18)
IMM GRANULOCYTES # BLD: 0.03 10*3/MM3 (ref 0–0.03)
IMM GRANULOCYTES NFR BLD: 0.3 % (ref 0–5)
LYMPHOCYTES # BLD AUTO: 2.84 10*3/MM3 (ref 0.72–4.86)
LYMPHOCYTES NFR BLD AUTO: 29.2 % (ref 15–45)
MCH RBC QN AUTO: 30.7 PG (ref 28–32)
MCHC RBC AUTO-ENTMCNC: 34.6 G/DL (ref 33–36)
MCV RBC AUTO: 88.6 FL (ref 82–95)
MONOCYTES # BLD AUTO: 0.96 10*3/MM3 (ref 0.19–1.3)
MONOCYTES NFR BLD AUTO: 9.9 % (ref 4–12)
NEUTROPHILS # BLD AUTO: 5.7 10*3/MM3 (ref 1.87–8.4)
NEUTROPHILS NFR BLD AUTO: 58.8 % (ref 39–78)
NRBC BLD MANUAL-RTO: 0 /100 WBC (ref 0–0)
PLATELET # BLD AUTO: 143 10*3/MM3 (ref 130–400)
PMV BLD AUTO: 12.9 FL (ref 6–12)
POTASSIUM BLD-SCNC: 4 MMOL/L (ref 3.5–5.3)
PROT SERPL-MCNC: 6.5 G/DL (ref 6.3–8.7)
RBC # BLD AUTO: 4.11 10*6/MM3 (ref 4.8–5.9)
SODIUM BLD-SCNC: 142 MMOL/L (ref 135–145)
TSH SERPL DL<=0.05 MIU/L-ACNC: 1.3 MIU/ML (ref 0.47–4.68)
WBC NRBC COR # BLD: 9.71 10*3/MM3 (ref 4.8–10.8)

## 2018-02-16 PROCEDURE — 80053 COMPREHEN METABOLIC PANEL: CPT | Performed by: NURSE PRACTITIONER

## 2018-02-16 PROCEDURE — 76775 US EXAM ABDO BACK WALL LIM: CPT

## 2018-02-16 PROCEDURE — 84443 ASSAY THYROID STIM HORMONE: CPT | Performed by: NURSE PRACTITIONER

## 2018-02-16 PROCEDURE — 83036 HEMOGLOBIN GLYCOSYLATED A1C: CPT | Performed by: NURSE PRACTITIONER

## 2018-02-16 PROCEDURE — 82962 GLUCOSE BLOOD TEST: CPT

## 2018-02-16 PROCEDURE — 85025 COMPLETE CBC W/AUTO DIFF WBC: CPT | Performed by: NURSE PRACTITIONER

## 2018-02-16 PROCEDURE — 63710000001 INSULIN LISPRO (HUMAN) PER 5 UNITS: Performed by: NURSE PRACTITIONER

## 2018-02-16 RX ADMIN — LEVOTHYROXINE SODIUM 75 MCG: 75 TABLET ORAL at 06:17

## 2018-02-16 RX ADMIN — ATORVASTATIN CALCIUM 20 MG: 10 TABLET, FILM COATED ORAL at 09:32

## 2018-02-16 RX ADMIN — SODIUM CHLORIDE 100 ML/HR: 9 INJECTION, SOLUTION INTRAVENOUS at 09:29

## 2018-02-16 RX ADMIN — FAMOTIDINE 40 MG: 20 TABLET, FILM COATED ORAL at 09:31

## 2018-02-16 RX ADMIN — SODIUM CHLORIDE 150 ML/HR: 9 INJECTION, SOLUTION INTRAVENOUS at 01:01

## 2018-02-16 RX ADMIN — SERTRALINE 100 MG: 100 TABLET, FILM COATED ORAL at 09:31

## 2018-02-16 RX ADMIN — INSULIN LISPRO 2 UNITS: 100 INJECTION, SOLUTION INTRAVENOUS; SUBCUTANEOUS at 20:34

## 2018-02-16 RX ADMIN — SODIUM CHLORIDE 75 ML/HR: 9 INJECTION, SOLUTION INTRAVENOUS at 20:35

## 2018-02-16 NOTE — PLAN OF CARE
Problem: Patient Care Overview (Adult)  Goal: Plan of Care Review  Outcome: Ongoing (interventions implemented as appropriate)   02/16/18 1549   Coping/Psychosocial Response Interventions   Plan Of Care Reviewed With patient;spouse   Patient Care Overview   Progress improving   Outcome Evaluation   Outcome Summary/Follow up Plan A;lert and oriented. Up ad lian. Labs improving, Denies pain.      Goal: Adult Individualization and Mutuality  Outcome: Ongoing (interventions implemented as appropriate)    Goal: Discharge Needs Assessment  Outcome: Ongoing (interventions implemented as appropriate)      Problem: Renal Failure/Kidney Injury, Acute (Adult)  Goal: Signs and Symptoms of Listed Potential Problems Will be Absent or Manageable (Renal Failure/Kidney Injury, Acute)  Outcome: Ongoing (interventions implemented as appropriate)

## 2018-02-16 NOTE — PLAN OF CARE
Problem: Patient Care Overview (Adult)  Goal: Plan of Care Review  Outcome: Ongoing (interventions implemented as appropriate)  ivf cont. Voiding per urinal. Improved urine output. Urine clear yellow. Denies pain. Monitor and treat bld sugar per order. bld sugar 182. No acute distress noted. Cont to monitor.    02/16/18 0335   Coping/Psychosocial Response Interventions   Plan Of Care Reviewed With patient   Patient Care Overview   Progress improving     Goal: Adult Individualization and Mutuality  Outcome: Ongoing (interventions implemented as appropriate)    Goal: Discharge Needs Assessment  Outcome: Ongoing (interventions implemented as appropriate)      Problem: Renal Failure/Kidney Injury, Acute (Adult)  Goal: Signs and Symptoms of Listed Potential Problems Will be Absent or Manageable (Renal Failure/Kidney Injury, Acute)  Outcome: Ongoing (interventions implemented as appropriate)

## 2018-02-16 NOTE — PROGRESS NOTES
[facility] Medicine Services  INPATIENT PROGRESS NOTE    Length of Stay: 0  Date of Admission: 2/15/2018  Primary Care Physician: No Known Provider    Subjective   Chief Complaint: Follow-up acute kidney injury  HPI   The patient is resting in bed with wife at bedside. Overall, he is feeling better today. He still has some complaints of intermittent lower back pain, but states this is much improved. He is voiding without difficulty. No complaints of chest pain or shortness of breath. He tells me that he does not regularly check his blood sugar at home, although he has the means and equipment to do so.    Review of Systems   All pertinent negatives and positives are as above. All other systems have been reviewed and are negative unless otherwise stated.     Objective    Temp:  [98 °F (36.7 °C)-98.6 °F (37 °C)] 98.4 °F (36.9 °C)  Heart Rate:  [70-89] 70  Resp:  [16-18] 16  BP: ()/(59-69) 114/64  Physical Exam   Constitutional: He is oriented to person, place, and time. He appears well-developed and well-nourished. No distress.   HENT:   Head: Normocephalic and atraumatic.   Neck: Normal range of motion. Neck supple.   Cardiovascular: Normal rate, regular rhythm, normal heart sounds and intact distal pulses.  Exam reveals no gallop and no friction rub.    No murmur heard.  Pulmonary/Chest: Effort normal and breath sounds normal. He has no wheezes. He has no rales.   Abdominal: Soft. Bowel sounds are normal. He exhibits no distension. There is no tenderness.   Musculoskeletal: Normal range of motion. He exhibits no edema.   Neurological: He is alert and oriented to person, place, and time.   Skin: Skin is warm and dry.   Psychiatric: He has a normal mood and affect. His behavior is normal. Judgment and thought content normal.   Vitals reviewed.    Results Review:  I have reviewed the labs, radiology results, and diagnostic studies.    Laboratory Data:     Results from last 7  days  Lab Units 02/16/18  0512 02/15/18  1458   WBC 10*3/mm3 9.71 13.00*   HEMOGLOBIN g/dL 12.6* 12.7*   HEMATOCRIT % 36.4* 37.8*   PLATELETS 10*3/mm3 143 166       Results from last 7 days  Lab Units 02/16/18  0512 02/15/18  1458   SODIUM mmol/L 142 139   POTASSIUM mmol/L 4.0 4.0   CHLORIDE mmol/L 105 97*   CO2 mmol/L 26.0 25.0   BUN mg/dL 40* 50*   CREATININE mg/dL 1.92* 3.59*   CALCIUM mg/dL 8.7 9.0   BILIRUBIN mg/dL 0.8  --    ALK PHOS U/L 50  --    ALT (SGPT) U/L 45  --    AST (SGOT) U/L 30  --    GLUCOSE mg/dL 138* 113*     Radiology Data:   Imaging Results (last 24 hours)     Procedure Component Value Units Date/Time    CT Abdomen Pelvis Without Contrast [[id number]] Collected:  02/15/18 1550     Updated:  02/15/18 1558    Narrative:       EXAMINATION: CT ABDOMEN PELVIS WO CONTRAST-  2/15/2018 3:50 PM CST     HISTORY: Bilateral flank pain     COMPARISON:None     TECHNIQUE:  Radiation dose equals DLP blank mGy-cm.  Automated exposure  control dose reduction technique was implemented.     Thin section axial imaging was obtained. 2-D sagittal and coronal  reconstruction images were generated.     Intravenous contrast was not administered.     Oral contrast was not ingested.     FINDINGS:     There are no urinary tract calculi. There is no pelvocaliectasis or  hydroureter or obstructive uropathy changes. The urinary bladder is  minimally distended without focal bladder wall abnormality. Prostate  gland is not enlarged.     The lung bases are grossly clear.     There is no CT evidence of gallstones. There are no focal hepatic  lesions.     The spleen is not enlarged.     There are no adrenal masses or pancreatic lesions.     Changes from right hemicolectomy observed. Stool and gas appreciated in  the colon which is not dilated. There is no diverticular changes. The  small bowel is not dilated.     The duodenal sweep imaged appropriately. The stomach is not distended.     A retroaortic left renal vein is identified,  normal variant.     There is mild atherosclerotic aortoiliac calcifications.     There are no pathologically enlarged lymph nodes.     Spondylosis changes noted in the thoracolumbar spine.     Small fat-containing periumbilical hernia changes noted without bowel  involvement.       Impression:       1. No urinary tract calculi or obstructive uropathy changes.  2. No CT evidence of acute intra-abdominal/pelvic pathological process.  This report was finalized on 02/15/2018 15:55 by Dr. Amish Eastman MD.        I have reviewed the patient current medications.     Assessment/Plan   Assessment:   1.  Acute kidney injury, without previous history of known kidney disease. Creatinine in September 2017 was 0.82. Fena of 1.4%- intrinsic renal failure- likely ATN  2.  Leukocytosis, resolved  3.  Normocytic anemia, stable  4.  Non-insulin dependent Diabetes mellitus type II, hgb A1C 8.1  5.  History of Hypertension, slightly hypotensive since admission  6.  Hyperlipidemia  7.  Hypothyroidism  8.  Obesity, BMI 35.9    Plan:  1. Decrease IV fluid rate to 75 ml/hr  2. Continue to hold Lisinopril, Indapamide, and Metformin. We may be discontinuing Jardiance altogether at discharge.  3. Renal ultrasound pending  4. Last blood glucoses- 103, 182, 138, 141. Continue Accuchecks and sliding scale insulin. Will also have diabetic educator come speak to patient  5. No growth on urine culture at less than 24 hours  6. Have encouraged ambulation and increased activity as tolerated today  7. Labs in AM- CBC, BMP    Discharge Planning: I expect the patient to be discharged to home tomorrow with continued improvement.    JOSUÉ Lea   02/16/18   9:43 AM     I personally evaluated and examined the patient in conjunction with JOSUÉ Hernandez and agree with the assessment, treatment plan, and disposition of the patient as recorded by her. My history, exam, and further recommendations are:     Up in bed.  No distress.  His wife is  present with him.  He feels well today.    Bilateral renal ultrasound done this morning shows no abnormality of either kidney.    Continue to hold nephrotoxins.  Continue IV fluids.  His renal failure is improving significantly.  I would plan on not restarting Jardiance as an outpatient.  His hemoglobin A1c is fairly well controlled at 8.1.  He may be better suited to take metformin and glipizide in combination. Will end up deferring to primary care.     Home tomorrow.     Davonte Carvalho,   02/16/18  11:06 AM

## 2018-02-16 NOTE — PAYOR COMM NOTE
"[facility]    PADMINI,   [phone]  OR   FAX   [phone]    REF: [id number] REQUESTING APPROVAL FOR INPT     Malvin Duggan (62 y.o. Male)     Date of Birth Social Security Number Address Home Phone MRN    [id number]  [id number] [id number]    Jew Marital Status          Unknown        Admission Date Admission Type Admitting Provider Attending Provider Department, Room/Bed    2/15/18 Emergency Davonte Carvalho DO Hancock, John C, DO [facility] 3C, 374/1    Discharge Date Discharge Disposition Discharge Destination                      Attending Provider: Davonte Carvalho DO     Allergies:  Doxycycline    Isolation:  None   Infection:  None   Code Status:  FULL    Ht:  177.8 cm (70\")   Wt:  113 kg (250 lb 1.6 oz)    Admission Cmt:  None   Principal Problem:  None                Active Insurance as of 2/15/2018     Primary Coverage     Payor Plan Insurance Group Employer/Plan Group    [facility] [id number]     Payor Plan Address Payor Plan Phone Number Effective From Effective To    [address] [phone] 11/24/2015     [place] [address]       Subscriber Name Subscriber Birth Date Member ID       MALVIN DUGGAN 1955 [id number]                 Emergency Contacts      (Rel.) Home Phone Work Phone Mobile Phone    Patti Mcgee (Spouse) -- -- [phone]        Valeri Arias LPN Licensed Nurse Signed  Plan of Care Date of Service: 2/16/2018  3:39 AM         Problem: Patient Care Overview (Adult)  Goal: Plan of Care Review  Outcome: Ongoing (interventions implemented as appropriate)  ivf cont. Voiding per urinal. Improved urine output. Urine clear yellow. Denies pain. Monitor and treat bld sugar per order. bld sugar 182. No acute distress noted. Cont to monitor.     02/16/18 0335   Coping/Psychosocial Response Interventions   Plan Of Care Reviewed With patient   Patient Care " Overview   Progress improving                  History & Physical      Davonte Carvalho DO at 2/15/2018  5:31 PM              [facility] Medicine Services  HISTORY AND PHYSICAL    Date of Admission: 2/15/2018  Primary Care Physician: Keanu Aguiar DO    Subjective     Chief Complaint: Difficulty urinating    History of Present Illness  Mr. Duggan is a 62-year-old  male who follows Dr. Eliezer Bruner for primary care.  He has a past medical history significant for hypertension, hyperlipidemia, hypothyroidism, gastroesophageal reflux disease, and diabetes mellitus type II.  The patient states that yesterday afternoon he was feeling the urge to void but was unable to void- and prior to this the last time he was able to void was Tuesday night.  He also relates intermittent low back pain, described as aching, for the last 2-3 days.  He denies any dysuria, frequency, or urgency.  He also experienced one episode of vomiting last night.  He described his emesis as nonbloody, and nonbilious.  This was described as clear with undigested food.  He has had no difficulty with nausea or vomiting today, and has tolerated regular food.  His wife states that she did take his temperature last night, and the highest reached was 99.7.  He states that yesterday he did have a period for a few hours where he felt very fatigued, almost drowsy.  The patient states that he has never had any episodes or problems like this before.    The patient does tell me that around 3 months ago he was started on Jardiance, but that is his only recent medication change.    Review of Systems   Constitutional: Positive for fatigue and fever. Negative for appetite change and diaphoresis.   Respiratory: Negative for cough, chest tightness and shortness of breath.    Cardiovascular: Negative for chest pain, palpitations and leg swelling.   Gastrointestinal: Positive for nausea and vomiting. Negative for abdominal  distention, abdominal pain, blood in stool, constipation and diarrhea.   Genitourinary: Positive for difficulty urinating. Negative for dysuria, flank pain, frequency and urgency.   Musculoskeletal: Positive for back pain.   Neurological: Negative for dizziness, syncope, weakness, light-headedness and numbness.     Otherwise complete ROS reviewed and negative except as mentioned in the HPI.    Past Medical History:   Past Medical History:   Diagnosis Date   • Black lung disease    • Diabetes mellitus     Type 2   • Disease of thyroid gland    • GERD (gastroesophageal reflux disease)    • Hypertension    • Leukocytosis 2017   • Neck nodule 2017     Past Surgical History:  Past Surgical History:   Procedure Laterality Date   • COLON SURGERY      colon resection & polyp removed   • ELBOW ARTHROSCOPY     • SHOULDER ARTHROTOMY     • THUMB ARTHROSCOPY       Social History: The patient reports that he has never been a smoker.  He does drink alcohol, only socially, approximately 1-2 times per month.  He denies any illicit drug use.  He is , and has several adopted children.  He is a retired mine worker.    Family History: The patient reports both his mother and father are  from esophageal cancer.  He does have a sister with heart disease who is status post CABG.    Allergies:  Allergies   Allergen Reactions   • Doxycycline Anaphylaxis and Swelling     Medications:  Prior to Admission medications    Medication Sig Start Date End Date Taking? Authorizing Provider   atorvastatin (LIPITOR) 20 MG tablet Take 20 mg by mouth Daily.   Yes Historical Provider, MD   Empagliflozin (JARDIANCE) 25 MG tablet Take 25 mg by mouth Daily. 17  Yes Historical Provider, MD   gabapentin (NEURONTIN) 300 MG capsule Take 300 mg by mouth 3 (Three) Times a Day.   Yes Historical Provider, MD   indapamide (LOZOL) 1.25 MG tablet Take 1.25 mg by mouth Every Morning.   Yes Historical Provider, MD   levothyroxine (SYNTHROID,  "LEVOTHROID) 75 MCG tablet Take 75 mcg by mouth Daily.   Yes Historical Provider, MD   lisinopril (PRINIVIL,ZESTRIL) 40 MG tablet Take 40 mg by mouth Daily.   Yes Historical Provider, MD   metFORMIN (GLUCOPHAGE) 500 MG tablet Take 500 mg by mouth 3 (Three) Times a Day.   Yes Historical Provider, MD   sertraline (ZOLOFT) 100 MG tablet Take 100 mg by mouth Daily.   Yes Historical Provider, MD     Objective     Vital Signs: BP 95/59 (BP Location: Right arm, Patient Position: Lying) Comment: nurse notified  Pulse 79  Temp 98.5 °F (36.9 °C) (Oral)   Resp 16  Ht 177.8 cm (70\")  Wt 113 kg (250 lb 1.6 oz)  SpO2 94%  BMI 35.89 kg/m2  Physical Exam   Constitutional: He is oriented to person, place, and time. He appears well-developed and well-nourished. No distress.   HENT:   Head: Normocephalic and atraumatic.   Neck: Normal range of motion. Neck supple.   Cardiovascular: Normal rate, regular rhythm, normal heart sounds and intact distal pulses.  Exam reveals no gallop and no friction rub.    No murmur heard.  Pulmonary/Chest: Effort normal and breath sounds normal. He has no wheezes. He has no rales.   Abdominal: Soft. Bowel sounds are normal. He exhibits no distension. There is no tenderness.   Musculoskeletal: Normal range of motion. He exhibits no edema.   Neurological: He is alert and oriented to person, place, and time.   Skin: Skin is warm and dry.   Psychiatric: He has a normal mood and affect. His behavior is normal. Judgment and thought content normal.   Vitals reviewed.    Results Reviewed:  Lab Results (last 24 hours)     Procedure Component Value Units Date/Time    CBC & Differential [[id number]] Collected:  02/15/18 1458    Specimen:  Blood Updated:  02/15/18 2325    Narrative:       The following orders were created for panel order CBC & Differential.  Procedure                               Abnormality         Status                     ---------                               -----------         ------   "                   Manual Differential[[id number]]                                                         CBC Auto Differential[[id number]]        Abnormal            Final result                 Please view results for these tests on the individual orders.    CBC Auto Differential [[id number]]  (Abnormal) Collected:  02/15/18 1458    Specimen:  Blood Updated:  02/15/18 1515     WBC 13.00 (H) 10*3/mm3      RBC 4.22 (L) 10*6/mm3      Hemoglobin 12.7 (L) g/dL      Hematocrit 37.8 (L) %      MCV 89.6 fL      MCH 30.1 pg      MCHC 33.6 g/dL      RDW 13.9 %      RDW-SD 45.1 fl      MPV 12.8 (H) fL      Platelets 166 10*3/mm3      nRBC 0.0 /100 WBC     Narrative:       ckd    Manual Differential [[id number]]  (Abnormal) Collected:  02/15/18 1458    Specimen:  Blood Updated:  02/15/18 1515     Neutrophil % 68.0 %      Lymphocyte % 24.0 %      Monocyte % 7.0 %      Eosinophil % 1.0 %      Neutrophils Absolute 8.84 (H) 10*3/mm3      Lymphocytes Absolute 3.12 10*3/mm3      Monocytes Absolute 0.91 10*3/mm3      Eosinophils Absolute 0.13 10*3/mm3      RBC Morphology Normal     WBC Morphology Normal     Platelet Estimate Adequate    Basic Metabolic Panel [[id number]]  (Abnormal) Collected:  02/15/18 1458    Specimen:  Blood Updated:  02/15/18 1519     Glucose 113 (H) mg/dL      BUN 50 (H) mg/dL      Creatinine 3.59 (H) mg/dL      Sodium 139 mmol/L      Potassium 4.0 mmol/L      Chloride 97 (L) mmol/L      CO2 25.0 mmol/L      Calcium 9.0 mg/dL      eGFR Non African Amer 17 (L) mL/min/1.73      BUN/Creatinine Ratio 13.9     Anion Gap 17.0 (H) mmol/L     Narrative:       GFR Normal >60  Chronic Kidney Disease <60  Kidney Failure <15    Urine Culture - Urine, Urine, Clean Catch [[id number]] Collected:  02/15/18 1504    Specimen:  Urine from Urine, Clean Catch Updated:  02/15/18 1521    Urinalysis With / Culture If Indicated - Urine, Clean Catch [[id number]]  (Abnormal) Collected:  02/15/18 1504    Specimen:  Urine from Urine, Clean Catch  Updated:  02/15/18 1533     Color, UA Yellow     Appearance, UA Cloudy (A)     pH, UA <=5.0     Specific Gravity, UA 1.024     Glucose, UA >=1000 mg/dL (3+) (A)     Ketones, UA Trace (A)     Bilirubin, UA Negative     Blood, UA Negative     Protein, UA Trace (A)     Leuk Esterase, UA Trace (A)     Nitrite, UA Negative     Urobilinogen, UA 0.2 E.U./dL    Urinalysis, Microscopic Only - Urine, Clean Catch [[id number]]  (Abnormal) Collected:  02/15/18 1504    Specimen:  Urine from Urine, Clean Catch Updated:  02/15/18 1533     RBC, UA None Seen /HPF      WBC, UA 0-2 (A) /HPF      Bacteria, UA None Seen /HPF      Squamous Epithelial Cells, UA 0-2 /HPF      Hyaline Casts, UA None Seen /LPF      Calcium Oxalate Crystals, UA Small/1+ /HPF      Mucus, UA Trace /HPF      Methodology Manual Light Microscopy    Red Top [[id number]] Collected:  02/15/18 1458    Specimen:  Blood Updated:  02/15/18 1601     Extra Tube Hold for add-ons.      Auto resulted.       [place] Draw [[id number]] Collected:  02/15/18 1458    Specimen:  Blood Updated:  02/15/18 1601    Narrative:       The following orders were created for panel order [place] Draw.  Procedure                               Abnormality         Status                     ---------                               -----------         ------                     Light Blue Top[[id number]]                                   Final result               Red Top[[id number]]                                          Final result                 Please view results for these tests on the individual orders.    Light Blue Top [[id number]] Collected:  02/15/18 1458    Specimen:  Blood Updated:  02/15/18 1601     Extra Tube hold for add-on      Auto resulted           Imaging Results (last 24 hours)     Procedure Component Value Units Date/Time    CT Abdomen Pelvis Without Contrast [[id number]] Collected:  02/15/18 1550     Updated:  02/15/18 1558    Narrative:       EXAMINATION: CT ABDOMEN PELVIS WO  CONTRAST-  2/15/2018 3:50 PM CST     HISTORY: Bilateral flank pain     COMPARISON:None     TECHNIQUE:  Radiation dose equals DLP blank mGy-cm.  Automated exposure  control dose reduction technique was implemented.     Thin section axial imaging was obtained. 2-D sagittal and coronal  reconstruction images were generated.     Intravenous contrast was not administered.     Oral contrast was not ingested.     FINDINGS:     There are no urinary tract calculi. There is no pelvocaliectasis or  hydroureter or obstructive uropathy changes. The urinary bladder is  minimally distended without focal bladder wall abnormality. Prostate  gland is not enlarged.     The lung bases are grossly clear.     There is no CT evidence of gallstones. There are no focal hepatic  lesions.     The spleen is not enlarged.     There are no adrenal masses or pancreatic lesions.     Changes from right hemicolectomy observed. Stool and gas appreciated in  the colon which is not dilated. There is no diverticular changes. The  small bowel is not dilated.     The duodenal sweep imaged appropriately. The stomach is not distended.     A retroaortic left renal vein is identified, normal variant.     There is mild atherosclerotic aortoiliac calcifications.     There are no pathologically enlarged lymph nodes.     Spondylosis changes noted in the thoracolumbar spine.     Small fat-containing periumbilical hernia changes noted without bowel  involvement.       Impression:       1. No urinary tract calculi or obstructive uropathy changes.  2. No CT evidence of acute intra-abdominal/pelvic pathological process.  This report was finalized on 02/15/2018 15:55 by Dr. Amish Eastman MD.        I have personally reviewed and interpreted the radiology studies and ECG obtained at time of admission.     Assessment / Plan   Assessment:   1.  Acute kidney injury, without previous history of known kidney disease. Creatinine in September 2017 was 0.82  2.   Leukocytosis  3.  Normocytic anemia  4.  Non-insulin dependent Diabetes mellitus type II  5.  History of Hypertension, slightly hypotensive since admission  6.  Hyperlipidemia  7.  Hypothyroidism  8.  Obesity, BMI 35.9    Plan:   1.  Admit to [facility] floor under observation status   2.  Resume home medications as appropriate, hold lisinopril, indapamide, and oral diabetic medications in the setting of acute kidney injury  3.  Check renal ultrasound  4.  Check serum/urine osmolality, urine sodium, urine urea nitrogen, urine creatinine  5.  Normal saline at 150 mL/hr  6.  Check post void residuals. No abnormalities of the prostate seen on CT and patient has been able to void 400 ml since arrival to floor  7.  Accu-Checks and sliding scale insulin  8.  CHITO hose /SCD's for venous thromboembolism prophylaxis  9.  Labs in AM- CBC, CMP, Hgb A1c, TSH  10.  Hold off on antibiotics for the moment. Patient is currently afebrile and symptoms are improving since arrival. Urine culture has been sent    Code Status: Full. In the event that the patient is not able to speak for himself he would like his wife, Patti Mcgee, to make decisions for him.     I discussed the patients findings and my recommendations with the patient, his wife Patti, and Dr. Carvalho    Estimated length of stay 2-3 days    JOSUÉ Lea   02/15/18   5:31 PM     I personally evaluated and examined the patient in conjunction with JOSUÉ Hernandez and agree with the assessment, treatment plan, and disposition of the patient as recorded by her. My history, exam, and further recommendations are:     Up in bed.  No distress.  Seen and discussed with his wife.    He was recently started on Jardiance as an outpatient.  He also takes lisinopril and metformin.    Admitted for hydration and further workup.  Agree with checking another urinalysis and spot electrolytes.  Baseline renal ultrasound, even though the CT of the abdomen and pelvis failed to show  any abnormalities of the prostate or collecting systems.    Hold antihypertensives and oral hypoglycemics.  Jardiance is known to cause acute kidney injury and this medication has been being titrated as an outpatient.    Hemoglobin A1c and TSH pending.    Nothing to suggest infection.    Davonte Carvalho DO  02/15/18  6:30 PM               Electronically signed by Davonte Carvalho DO at 2/15/2018  6:39 PM           Emergency Department Notes      Delfin Asher MD at 2/15/2018  1:59 PM          Subjective   HPI Comments: Patient comes in with a complaint that he has not urinated since last night in the meantime had multiple cup of coffee cups of coffee, Sprite, other carbonated drinks and water.  Patient does not feel any urge to urinate and does not complain of any abdominal distention.  Patient has some low back pain and vomited once.  Patient is complaining of feverish sensation although he does not have any fever here and has not taken any medication.  Patient called his physician and they were advised to come to the emergency department for further evaluation and treatment.  Patient denies any history of prostate problem although he is diabetic.  Patient the denies any other chronic illnesses other than the mentioned in patient's medical records    Patient is a 62 y.o. male presenting with difficulty urinating.   History provided by:  Patient and spouse  Difficulty Urinating   Presenting symptoms: no dysuria, no penile discharge, no penile pain, no scrotal pain and no swelling    Context: spontaneously    Context: not after injury, not after intercourse, not after urination, not during intercourse and not during urination    Relieved by:  Nothing  Worsened by:  Nothing  Ineffective treatments:  None tried  Associated symptoms: nausea, urinary retention and vomiting    Associated symptoms: no abdominal pain, no diarrhea, no fever, no flank pain, no genital itching, no genital lesions, no genital rash, no groin  pain, no hematuria, no penile redness, no penile swelling, no priapism, no scrotal swelling, no urinary frequency, no urinary hesitation and no urinary incontinence    Risk factors: no bladder surgery, no change in medication, no erectile dysfunction, no foreign body, no HIV, no kidney stones, does not have multiple sexual partners, no new sexual partner, no recent infection, not currently sexually active, no sickle cell disease, no STI exposure, no unprotected sex and no urinary catheter        Review of Systems   Constitutional: Negative for fever.   Gastrointestinal: Positive for nausea and vomiting. Negative for abdominal pain and diarrhea.   Genitourinary: Positive for difficulty urinating. Negative for bladder incontinence, discharge, dysuria, flank pain, frequency, hematuria, hesitancy, penile pain, penile swelling and scrotal swelling.   All other systems reviewed and are negative.      Past Medical History:   Diagnosis Date   • Black lung disease    • Diabetes mellitus     Type 2   • Disease of thyroid gland    • GERD (gastroesophageal reflux disease)    • Hypertension    • Leukocytosis 8/23/2017   • Neck nodule 8/23/2017       Allergies   Allergen Reactions   • Doxycycline Anaphylaxis and Swelling       Past Surgical History:   Procedure Laterality Date   • COLON SURGERY      colon resection & polyp removed   • ELBOW ARTHROSCOPY     • SHOULDER ARTHROTOMY     • THUMB ARTHROSCOPY         History reviewed. No pertinent family history.    Social History     Social History   • Marital status:      Spouse name: N/A   • Number of children: N/A   • Years of education: N/A     Social History Main Topics   • Smoking status: Never Smoker   • Smokeless tobacco: None   • Alcohol use No   • Drug use: No   • Sexual activity: Defer     Other Topics Concern   • None     Social History Narrative           Objective   Physical Exam   Constitutional: He is oriented to person, place, and time. He appears well-developed  and well-nourished. No distress.   HENT:   Head: Normocephalic and atraumatic.   Eyes: Conjunctivae are normal. Pupils are equal, round, and reactive to light.   Neck: Neck supple.   Cardiovascular: Normal rate, regular rhythm, normal heart sounds and intact distal pulses.    Pulmonary/Chest: Effort normal and breath sounds normal. No respiratory distress.   Abdominal: Soft. Bowel sounds are normal. He exhibits no distension. There is no tenderness.   Genitourinary: Penis normal.   Musculoskeletal: Normal range of motion. He exhibits no edema.   Neurological: He is alert and oriented to person, place, and time.   Skin: Skin is warm and dry. No rash noted. No erythema.   Psychiatric: He has a normal mood and affect.   Nursing note and vitals reviewed.      Procedures        ED Course  ED Course        Labs Reviewed   BASIC METABOLIC PANEL - Abnormal; Notable for the following:        Result Value    Glucose 113 (*)     BUN 50 (*)     Creatinine 3.59 (*)     Chloride 97 (*)     eGFR Non African Amer 17 (*)     Anion Gap 17.0 (*)     All other components within normal limits    Narrative:     GFR Normal >60  Chronic Kidney Disease <60  Kidney Failure <15   URINALYSIS W/ CULTURE IF INDICATED - Abnormal; Notable for the following:     Appearance, UA Cloudy (*)     Glucose, UA >=1000 mg/dL (3+) (*)     Ketones, UA Trace (*)     Protein, UA Trace (*)     Leuk Esterase, UA Trace (*)     All other components within normal limits   CBC WITH AUTO DIFFERENTIAL - Abnormal; Notable for the following:     WBC 13.00 (*)     RBC 4.22 (*)     Hemoglobin 12.7 (*)     Hematocrit 37.8 (*)     MPV 12.8 (*)     All other components within normal limits    Narrative:     ckd   URINALYSIS, MICROSCOPIC ONLY - Abnormal; Notable for the following:     WBC, UA 0-2 (*)     All other components within normal limits   MANUAL DIFFERENTIAL - Abnormal; Notable for the following:     Neutrophils Absolute 8.84 (*)     All other components within normal  limits   URINE CULTURE   RAINBOW DRAW    Narrative:     The following orders were created for panel order [place] Draw.  Procedure                               Abnormality         Status                     ---------                               -----------         ------                     Light Blue Top[[id number]]                                   Final result               Red Top[[id number]]                                          Final result                 Please view results for these tests on the individual orders.   CBC AND DIFFERENTIAL    Narrative:     The following orders were created for panel order CBC & Differential.  Procedure                               Abnormality         Status                     ---------                               -----------         ------                     Manual Differential[[id number]]                                                         CBC Auto Differential[[id number]]        Abnormal            Final result                 Please view results for these tests on the individual orders.   LIGHT BLUE TOP   RED TOP       CT Abdomen Pelvis Without Contrast   Final Result   1. No urinary tract calculi or obstructive uropathy changes.   2. No CT evidence of acute intra-abdominal/pelvic pathological process.   This report was finalized on 02/15/2018 15:55 by Dr. Amish Eastman MD.                  [facility]    Final diagnoses:   Acute kidney injury (nontraumatic)            Delfin Asher MD  02/15/18 1615       Electronically signed by Delfin Asher MD at 2/15/2018  4:15 PM      Danae Sanchez RN at 2/15/2018  3:05 PM          Pt voided 100 ml POST bladder scan.     Danae Sanchez RN  02/15/18 1085       Electronically signed by Danae Sanchez RN at 2/15/2018  3:05 PM        Hospital Medications (all)       Dose Frequency Start End    acetaminophen (TYLENOL) tablet 650 mg 650 mg Every 4 Hours PRN 2/15/2018     Sig - Route: Take 2 tablets by mouth Every 4  "(Four) Hours As Needed for Mild Pain . - Oral    atorvastatin (LIPITOR) tablet 20 mg 20 mg Daily 2/16/2018     Sig - Route: Take 2 tablets by mouth Daily. - Oral    dextrose (D50W) solution 25 g 25 g Every 15 Minutes PRN 2/15/2018     Sig - Route: Infuse 50 mL into a venous catheter Every 15 (Fifteen) Minutes As Needed for Low Blood Sugar (Blood Sugar Less Than 70, Patient Has IV Access - Unresponsive, NPO or Unable To Safely Swallow). - Intravenous    dextrose (GLUTOSE) oral gel 15 g 15 g Every 15 Minutes PRN 2/15/2018     Sig - Route: Take 15 g by mouth Every 15 (Fifteen) Minutes As Needed for Low Blood Sugar (BS<70, Patient Alert, Is not NPO, Can safely swallow.). - Oral    famotidine (PEPCID) tablet 40 mg 40 mg Daily 2/15/2018     Sig - Route: Take 2 tablets by mouth Daily. - Oral    glucagon (human recombinant) (GLUCAGEN DIAGNOSTIC) injection 1 mg 1 mg As Needed 2/15/2018     Sig - Route: Inject 1 mg under the skin As Needed (Blood Glucose Less Than 70 - Patient Without IV Access - Unresponsive, NPO or Unable To Safely Swallow). - Subcutaneous    insulin lispro (humaLOG) injection 2-7 Units 2-7 Units 4 Times Daily With Meals & Nightly 2/15/2018     Sig - Route: Inject 2-7 Units under the skin 4 (Four) Times a Day With Meals & at Bedtime. - Subcutaneous    levothyroxine (SYNTHROID, LEVOTHROID) tablet 75 mcg 75 mcg Every Early Morning 2/15/2018     Sig - Route: Take 1 tablet by mouth Every Morning. - Oral    ondansetron (ZOFRAN) injection 4 mg 4 mg Every 6 Hours PRN 2/15/2018     Sig - Route: Infuse 2 mL into a venous catheter Every 6 (Six) Hours As Needed for Nausea or Vomiting. - Intravenous    Linked Group 1:  \"Or\" Linked Group Details        ondansetron (ZOFRAN) tablet 4 mg 4 mg Every 6 Hours PRN 2/15/2018     Sig - Route: Take 1 tablet by mouth Every 6 (Six) Hours As Needed for Nausea or Vomiting. - Oral    Linked Group 1:  \"Or\" Linked Group Details        ondansetron ODT (ZOFRAN-ODT) disintegrating tablet 4 " "mg 4 mg Every 6 Hours PRN 2/15/2018     Sig - Route: Take 1 tablet by mouth Every 6 (Six) Hours As Needed for Nausea or Vomiting. - Oral    Linked Group 1:  \"Or\" Linked Group Details        sertraline (ZOLOFT) tablet 100 mg 100 mg Daily 2/15/2018     Sig - Route: Take 1 tablet by mouth Daily. - Oral    sodium chloride 0.9 % bolus 1,000 mL 1,000 mL Once 2/15/2018 2/15/2018    Sig - Route: Infuse 1,000 mL into a venous catheter 1 (One) Time. - Intravenous    sodium chloride 0.9 % infusion 75 mL/hr Continuous 2/15/2018     Sig - Route: Infuse 75 mL/hr into a venous catheter Continuous. - Intravenous    atorvastatin (LIPITOR) tablet 20 mg (Discontinued) 20 mg Nightly 2/15/2018 2/15/2018    Sig - Route: Take 2 tablets by mouth Every Night. - Oral    sodium chloride 0.9 % flush 10 mL (Discontinued) 10 mL As Needed 2/15/2018 2/15/2018    Sig - Route: Infuse 10 mL into a venous catheter As Needed for Line Care. - Intravenous    Linked Group 2:  \"And\" Linked Group Details              Orders (last 24 hrs)     Start     Ordered    02/17/18 0600  CBC (No Diff)  Morning Draw      02/16/18 1000    02/17/18 0600  Basic Metabolic Panel  Morning Draw      02/16/18 1000    02/16/18 1003  Inpatient Consult to Diabetes Educator  Once     Provider:  (Not yet assigned)    02/16/18 1002    02/16/18 0953  US Renal Bilateral  1 Time Imaging      02/15/18 1757    02/16/18 0900  atorvastatin (LIPITOR) tablet 20 mg  Daily      02/15/18 2111    02/16/18 0803  POC Glucose Once  Once      02/16/18 0802    02/16/18 0600  CBC Auto Differential  Morning Draw      02/15/18 1757    02/16/18 0600  Comprehensive Metabolic Panel  Morning Draw      02/15/18 1757    02/16/18 0600  TSH  Morning Draw      02/15/18 1757    02/16/18 0600  Hemoglobin A1c  Morning Draw      02/15/18 1757    02/16/18 0000  Strict Intake and Output  Every 8 Hours      02/15/18 1757    02/15/18 2200  POC Glucose 4x Daily AC & at Bedtime  4 Times Daily Before Meals & at Bedtime   "    02/15/18 1800    02/15/18 2115  POC Glucose Once  Once      02/15/18 2114    02/15/18 2100  atorvastatin (LIPITOR) tablet 20 mg  Nightly,   Status:  Discontinued      02/15/18 1757    02/15/18 2000  Vital Signs  Every 4 Hours      02/15/18 1757    02/15/18 1853  POC Glucose Once  Once      02/15/18 1852    02/15/18 1845  insulin lispro (humaLOG) injection 2-7 Units  4 Times Daily With Meals & Nightly      02/15/18 1800    02/15/18 1830  sertraline (ZOLOFT) tablet 100 mg  Daily      02/15/18 1757    02/15/18 1830  sodium chloride 0.9 % infusion  Continuous      02/15/18 1757    02/15/18 1830  famotidine (PEPCID) tablet 40 mg  Daily      02/15/18 1757    02/15/18 1815  levothyroxine (SYNTHROID, LEVOTHROID) tablet 75 mcg  Every Early Morning      02/15/18 1757    02/15/18 1800  Do NOT Hold Basal Insulin When Patient is NPO, Hold Bolus Dose if NPO  Continuous      02/15/18 1800    02/15/18 1800  Follow [facility] Hypoglycemia Protocol For Blood Glucose Less Than 70 mg/dL  Until Discontinued      02/15/18 1800    02/15/18 1800  Hypoglycemia Treatment - Alert Patient That is Not NPO and Can Safely Swallow  Until Discontinued     Comments:  Administer 4 oz Fruit Juice OR 4 oz Regular Soda OR 8 oz Milk OR 15-30 grams (1 tube) of Glucose Gel.  Recheck Blood Glucose 15 Minutes After Ingestion, Repeat Treatment & Continue to Recheck Blood Sugar Every 15 Minutes Until Blood Glucose is 70 mg/dL or Higher.  Once Blood Glucose is 70 mg/dL or Higher and if It Will Be more than 60 Minutes Until the Next Meal, Provide Appropriate Snack (Including Carbohydrate Food) Based on Meal Plan Order. Give Meal Tray As Soon As Possible.    02/15/18 1800    02/15/18 1800  Hypoglycemia Treatment - Patient Has IV Access - Unresponsive, NPO or Unable To Safely Swallow  Until Discontinued     Comments:  Administer 25g (50ml) D50W IV Push.  Recheck Blood Glucose 15 Minutes After Administration, if Blood Glucose Remains Less Than 70 mg/dl, Repeat  Treatment   Recheck Blood Glucose 15 Minutes After 2nd Administration, if Blood Glucose Remains Less Than 70 mg/dL After 2nd Dose of D50W, Contact Provider for Further Treatment Orders & Consider Adding IVF With D5W for Maintenance    02/15/18 1800    02/15/18 1800  Hypoglycemia Treatment - Patient Without IV Access - Unresponsive, NPO or Unable To Safely Swallow  Until Discontinued     Comments:  Administer 1mg Glucagon SQ & Establish IV Access.  Turn Patient on Side - Nausea / Vomiting May Occur.  Recheck Blood Glucose 15 Minutes After Administration.  If Blood Glucose Remains Less Than 70, Administer 25g D50W IV Push (50ml).  Recheck Blood Glucose 15 Minutes After Administration of D50W, if Blood Glucose Remains Less Than 70 mg/dl, Contact Provider for Further Treatment Orders & Consider Adding IVF With D5 for Maintenance    02/15/18 1800    02/15/18 1800  Notify Provider of event. Communicate needs and document in EMR.  Once      02/15/18 1800    02/15/18 1800  Document event and patients response to treatment in EMR, any medications given to be documented on MAR.  Once      02/15/18 1800    02/15/18 1759  dextrose (GLUTOSE) oral gel 15 g  Every 15 Minutes PRN      02/15/18 1800    02/15/18 1759  dextrose (D50W) solution 25 g  Every 15 Minutes PRN      02/15/18 1800    02/15/18 1759  glucagon (human recombinant) (GLUCAGEN DIAGNOSTIC) injection 1 mg  As Needed      02/15/18 1800    02/15/18 1758  Weigh Patient  Daily      02/15/18 1757    02/15/18 1758  Remove & Replace Compression Stockings (TEDS) Daily  Daily      02/15/18 1757    02/15/18 1756  US Renal Limited  1 Time Imaging,   Status:  Canceled      02/15/18 1757    02/15/18 1755  Urea Nitrogen, Urine - Urine, Clean Catch  Once      02/15/18 1757    02/15/18 1755  Osmolality, Urine - Urine, Clean Catch  Once      02/15/18 1757    02/15/18 1755  Osmolality, Serum  Once      02/15/18 1757    02/15/18 1754  ondansetron (ZOFRAN) tablet 4 mg  Every 6 Hours PRN       02/15/18 1757    02/15/18 1754  ondansetron ODT (ZOFRAN-ODT) disintegrating tablet 4 mg  Every 6 Hours PRN      02/15/18 1757    02/15/18 1754  ondansetron (ZOFRAN) injection 4 mg  Every 6 Hours PRN      02/15/18 1757    02/15/18 1754  acetaminophen (TYLENOL) tablet 650 mg  Every 4 Hours PRN      02/15/18 1757    02/15/18 1754  Creatinine, Urine, Random - Urine, Clean Catch  Once      02/15/18 1757    02/15/18 1754  Sodium, Urine, Random - Urine, Clean Catch  Once      02/15/18 1757    02/15/18 1753  Full Code  Continuous      02/15/18 1757    02/15/18 1753  VTE Risk Assessment - Low Risk  Once      02/15/18 1757    02/15/18 1753  Pharmacologic VTE Prophylaxis Not Indicated: Low Risk for VTE  Once      02/15/18 1757    02/15/18 1753  Place Compression Stockings (TEDs)  Once      02/15/18 1757    02/15/18 1753  Place Sequential Compression Device  Once      02/15/18 1757    02/15/18 1753  Maintain Sequential Compression Device  Continuous      02/15/18 1757    02/15/18 1752  Oxygen Therapy-  Continuous      02/15/18 1757    02/15/18 1728  Diet Regular; Consistent Carbohydrate, Renal  Diet Effective Now      02/15/18 1728    02/15/18 1609  Initiate Observation Status  Once      02/15/18 1610    02/15/18 1609  Med / Surg Bed Request  Once      02/15/18 1610    02/15/18 1523  sodium chloride 0.9 % bolus 1,000 mL  Once      02/15/18 1521    02/15/18 1522  Urine Culture - Urine, Urine, Clean Catch  Once      02/15/18 1521    02/15/18 1521  CT Abdomen Pelvis Without Contrast  1 Time Imaging     Comments:  NON-CONTRASTED STUDY      02/15/18 1521    02/15/18 1520  Urinalysis, Microscopic Only - Urine, Clean Catch  Once      02/15/18 1519    02/15/18 1515  Manual Differential  Once      02/15/18 1514    02/15/18 1509  Manual Differential  Once,   Status:  Canceled      02/15/18 1508    02/15/18 1459  Miller Draw  Once      02/15/18 1458    02/15/18 1459  Light Blue Top  PROCEDURE ONCE      02/15/18 1458    02/15/18 1459   Red Top  PROCEDURE ONCE      02/15/18 1458    02/15/18 1349  Urinalysis With / Culture If Indicated - Urine, Clean Catch  Once      02/15/18 1348    02/15/18 1348  Bladder scan  Once      02/15/18 1347    02/15/18 1348  Insert peripheral IV  Once,   Status:  Canceled      02/15/18 1348    02/15/18 1348  CBC & Differential  Once      02/15/18 1348    02/15/18 1348  Basic Metabolic Panel  Once      02/15/18 1348    02/15/18 1348  CBC Auto Differential  PROCEDURE ONCE      02/15/18 1348    02/15/18 1347  sodium chloride 0.9 % flush 10 mL  As Needed,   Status:  Discontinued      02/15/18 1348    12/01/17 0000  Empagliflozin (JARDIANCE) 25 MG tablet  Daily      02/15/18 1526             Physician Progress Notes (last 24 hours) (Notes from 2/15/2018 11:28 AM through 2/16/2018 11:28 AM)      Davonte Carvalho, DO at 2/16/2018  9:42 AM  Version 1 of 1             [facility] Medicine Services  INPATIENT PROGRESS NOTE    Length of Stay: 0  Date of Admission: 2/15/2018  Primary Care Physician: No Known Provider    Subjective   Chief Complaint: Follow-up acute kidney injury  HPI   The patient is resting in bed with wife at bedside. Overall, he is feeling better today. He still has some complaints of intermittent lower back pain, but states this is much improved. He is voiding without difficulty. No complaints of chest pain or shortness of breath. He tells me that he does not regularly check his blood sugar at home, although he has the means and equipment to do so.    Review of Systems   All pertinent negatives and positives are as above. All other systems have been reviewed and are negative unless otherwise stated.     Objective    Temp:  [98 °F (36.7 °C)-98.6 °F (37 °C)] 98.4 °F (36.9 °C)  Heart Rate:  [70-89] 70  Resp:  [16-18] 16  BP: ()/(59-69) 114/64  Physical Exam   Constitutional: He is oriented to person, place, and time. He appears well-developed and well-nourished. No distress.    HENT:   Head: Normocephalic and atraumatic.   Neck: Normal range of motion. Neck supple.   Cardiovascular: Normal rate, regular rhythm, normal heart sounds and intact distal pulses.  Exam reveals no gallop and no friction rub.    No murmur heard.  Pulmonary/Chest: Effort normal and breath sounds normal. He has no wheezes. He has no rales.   Abdominal: Soft. Bowel sounds are normal. He exhibits no distension. There is no tenderness.   Musculoskeletal: Normal range of motion. He exhibits no edema.   Neurological: He is alert and oriented to person, place, and time.   Skin: Skin is warm and dry.   Psychiatric: He has a normal mood and affect. His behavior is normal. Judgment and thought content normal.   Vitals reviewed.    Results Review:  I have reviewed the labs, radiology results, and diagnostic studies.    Laboratory Data:     Results from last 7 days  Lab Units 02/16/18  0512 02/15/18  1458   WBC 10*3/mm3 9.71 13.00*   HEMOGLOBIN g/dL 12.6* 12.7*   HEMATOCRIT % 36.4* 37.8*   PLATELETS 10*3/mm3 143 166       Results from last 7 days  Lab Units 02/16/18  0512 02/15/18  1458   SODIUM mmol/L 142 139   POTASSIUM mmol/L 4.0 4.0   CHLORIDE mmol/L 105 97*   CO2 mmol/L 26.0 25.0   BUN mg/dL 40* 50*   CREATININE mg/dL 1.92* 3.59*   CALCIUM mg/dL 8.7 9.0   BILIRUBIN mg/dL 0.8  --    ALK PHOS U/L 50  --    ALT (SGPT) U/L 45  --    AST (SGOT) U/L 30  --    GLUCOSE mg/dL 138* 113*     Radiology Data:   Imaging Results (last 24 hours)     Procedure Component Value Units Date/Time    CT Abdomen Pelvis Without Contrast [[id number]] Collected:  02/15/18 1550     Updated:  02/15/18 1558    Narrative:       EXAMINATION: CT ABDOMEN PELVIS WO CONTRAST-  2/15/2018 3:50 PM CST     HISTORY: Bilateral flank pain     COMPARISON:None     TECHNIQUE:  Radiation dose equals DLP blank mGy-cm.  Automated exposure  control dose reduction technique was implemented.     Thin section axial imaging was obtained. 2-D sagittal and  coronal  reconstruction images were generated.     Intravenous contrast was not administered.     Oral contrast was not ingested.     FINDINGS:     There are no urinary tract calculi. There is no pelvocaliectasis or  hydroureter or obstructive uropathy changes. The urinary bladder is  minimally distended without focal bladder wall abnormality. Prostate  gland is not enlarged.     The lung bases are grossly clear.     There is no CT evidence of gallstones. There are no focal hepatic  lesions.     The spleen is not enlarged.     There are no adrenal masses or pancreatic lesions.     Changes from right hemicolectomy observed. Stool and gas appreciated in  the colon which is not dilated. There is no diverticular changes. The  small bowel is not dilated.     The duodenal sweep imaged appropriately. The stomach is not distended.     A retroaortic left renal vein is identified, normal variant.     There is mild atherosclerotic aortoiliac calcifications.     There are no pathologically enlarged lymph nodes.     Spondylosis changes noted in the thoracolumbar spine.     Small fat-containing periumbilical hernia changes noted without bowel  involvement.       Impression:       1. No urinary tract calculi or obstructive uropathy changes.  2. No CT evidence of acute intra-abdominal/pelvic pathological process.  This report was finalized on 02/15/2018 15:55 by Dr. Amish Eastman MD.        I have reviewed the patient current medications.     Assessment/Plan   Assessment:   1.  Acute kidney injury, without previous history of known kidney disease. Creatinine in September 2017 was 0.82. Fena of 1.4%- intrinsic renal failure- likely ATN  2.  Leukocytosis , resolved  3.  Normocytic anemia, stable  4.  Non-insulin dependent Diabetes mellitus type II, hgb A1C 8.1  5.  History of Hypertension, slightly hypotensive since admission  6.  Hyperlipidemia  7.  Hypothyroidism  8.  Obesity, BMI 35.9    Plan:  1. Decrease IV fluid rate to 75  ml/hr  2. Continue to hold Lisinopril, Indapamide, and Metformin. We may be discontinuing Jardiance altogether at discharge.  3. Renal ultrasound pending  4. Last blood glucoses- 103, 182, 138, 141. Continue Accuchecks and sliding scale insulin. Will also have diabetic educator come speak to patient  5. No growth on urine culture at less than 24 hours  6. Have encouraged ambulation and increased activity as tolerated today  7. Labs in AM- CBC, BMP    Discharge Planning: I expect the patient to be discharged to home tomorrow with continued improvement.    JOSUÉ Lea   02/16/18   9:43 AM     I personally evaluated and examined the patient in conjunction with JOSUÉ Hernandez and agree with the assessment, treatment plan, and disposition of the patient as recorded by her. My history, exam, and further recommendations are:     Up in bed.  No distress.  His wife is present with him.  He feels well today.    Bilateral renal ultrasound done this morning shows no abnormality of either kidney.    Continue to hold nephrotoxins.  Continue IV fluids.  His renal failure is improving significantly.  I would plan on not restarting Jardiance as an outpatient.  His hemoglobin A1c is fairly well controlled at 8.1.  He may be better suited to take metformin and glipizide in combination. Will end up deferring to primary care.     Home tomorrow.     Davonte Carvalho DO  02/16/18  11:06 AM       Electronically signed by Davonte Carvalho DO at 2/16/2018 11:14 AM

## 2018-02-17 VITALS
DIASTOLIC BLOOD PRESSURE: 78 MMHG | WEIGHT: 250.1 LBS | TEMPERATURE: 98.1 F | HEIGHT: 70 IN | SYSTOLIC BLOOD PRESSURE: 135 MMHG | BODY MASS INDEX: 35.8 KG/M2 | RESPIRATION RATE: 16 BRPM | OXYGEN SATURATION: 96 % | HEART RATE: 66 BPM

## 2018-02-17 LAB
ANION GAP SERPL CALCULATED.3IONS-SCNC: 12 MMOL/L (ref 4–13)
BACTERIA SPEC AEROBE CULT: ABNORMAL
BACTERIA SPEC AEROBE CULT: ABNORMAL
BUN BLD-MCNC: 29 MG/DL (ref 5–21)
BUN/CREAT SERPL: 26.4 (ref 7–25)
CALCIUM SPEC-SCNC: 8.9 MG/DL (ref 8.4–10.4)
CHLORIDE SERPL-SCNC: 103 MMOL/L (ref 98–110)
CO2 SERPL-SCNC: 29 MMOL/L (ref 24–31)
CREAT BLD-MCNC: 1.1 MG/DL (ref 0.5–1.4)
DEPRECATED RDW RBC AUTO: 43.7 FL (ref 40–54)
ERYTHROCYTE [DISTWIDTH] IN BLOOD BY AUTOMATED COUNT: 13.3 % (ref 12–15)
GFR SERPL CREATININE-BSD FRML MDRD: 67 ML/MIN/1.73
GLUCOSE BLD-MCNC: 145 MG/DL (ref 70–100)
GLUCOSE BLDC GLUCOMTR-MCNC: 135 MG/DL (ref 70–130)
HCT VFR BLD AUTO: 37.2 % (ref 40–52)
HGB BLD-MCNC: 12.5 G/DL (ref 14–18)
MCH RBC QN AUTO: 30 PG (ref 28–32)
MCHC RBC AUTO-ENTMCNC: 33.6 G/DL (ref 33–36)
MCV RBC AUTO: 89.4 FL (ref 82–95)
PLATELET # BLD AUTO: 149 10*3/MM3 (ref 130–400)
PMV BLD AUTO: 13.1 FL (ref 6–12)
POTASSIUM BLD-SCNC: 3.8 MMOL/L (ref 3.5–5.3)
RBC # BLD AUTO: 4.16 10*6/MM3 (ref 4.8–5.9)
SODIUM BLD-SCNC: 144 MMOL/L (ref 135–145)
WBC NRBC COR # BLD: 9.79 10*3/MM3 (ref 4.8–10.8)

## 2018-02-17 PROCEDURE — 82962 GLUCOSE BLOOD TEST: CPT

## 2018-02-17 PROCEDURE — 85027 COMPLETE CBC AUTOMATED: CPT | Performed by: NURSE PRACTITIONER

## 2018-02-17 PROCEDURE — 80048 BASIC METABOLIC PNL TOTAL CA: CPT | Performed by: NURSE PRACTITIONER

## 2018-02-17 RX ORDER — GLIPIZIDE 5 MG/1
5 TABLET ORAL DAILY
Qty: 30 TABLET | Refills: 0 | Status: SHIPPED | OUTPATIENT
Start: 2018-02-17 | End: 2021-04-19 | Stop reason: ALTCHOICE

## 2018-02-17 RX ORDER — LISINOPRIL 40 MG/1
20 TABLET ORAL DAILY
Qty: 30 TABLET | Refills: 0 | Status: SHIPPED | OUTPATIENT
Start: 2018-02-17

## 2018-02-17 RX ADMIN — LEVOTHYROXINE SODIUM 75 MCG: 75 TABLET ORAL at 06:15

## 2018-02-17 RX ADMIN — FAMOTIDINE 40 MG: 20 TABLET, FILM COATED ORAL at 08:17

## 2018-02-17 RX ADMIN — SERTRALINE 100 MG: 100 TABLET, FILM COATED ORAL at 08:17

## 2018-02-17 RX ADMIN — ATORVASTATIN CALCIUM 20 MG: 10 TABLET, FILM COATED ORAL at 08:17

## 2018-02-17 NOTE — DISCHARGE SUMMARY
[facility]  DISCHARGE SUMMARY       Date of Admission: 2/15/2018  Date of Discharge:  2/17/2018  Primary Care Physician: Dr. Eliezer Bruner    Presenting Problem/History of Present Illness:  Decreased urination.     Final Discharge Diagnoses:  1.  Acute kidney injury, without previous history of known kidney disease. Creatinine in September 2017 was 0.82. Fena of 1.4%- intrinsic renal failure- likely ATN  2.  Leukocytosis, resolved  3.  Normocytic anemia, stable  4.  Non-insulin dependent Diabetes mellitus type II, hgb A1C 8.1  5.  History of Hypertension, slightly hypotensive since admission  6.  Hyperlipidemia  7.  Hypothyroidism  8.  Obesity, BMI 35.9    Consults: None    Procedures Performed: None    Pertinent Test Results:   Lab Results (all)     Procedure Component Value Units Date/Time    CBC Auto Differential [[id number]]  (Abnormal) Collected:  02/15/18 1458    Specimen:  Blood Updated:  02/15/18 1515     WBC 13.00 (H) 10*3/mm3      RBC 4.22 (L) 10*6/mm3      Hemoglobin 12.7 (L) g/dL      Hematocrit 37.8 (L) %      MCV 89.6 fL      MCH 30.1 pg      MCHC 33.6 g/dL      RDW 13.9 %      RDW-SD 45.1 fl      MPV 12.8 (H) fL      Platelets 166 10*3/mm3      nRBC 0.0 /100 WBC     Narrative:       ckd    Manual Differential [[id number]]  (Abnormal) Collected:  02/15/18 1458    Specimen:  Blood Updated:  02/15/18 1515     Neutrophil % 68.0 %      Lymphocyte % 24.0 %      Monocyte % 7.0 %      Eosinophil % 1.0 %      Neutrophils Absolute 8.84 (H) 10*3/mm3      Lymphocytes Absolute 3.12 10*3/mm3      Monocytes Absolute 0.91 10*3/mm3      Eosinophils Absolute 0.13 10*3/mm3      RBC Morphology Normal     WBC Morphology Normal     Platelet Estimate Adequate    Basic Metabolic Panel [[id number]]  (Abnormal) Collected:  02/15/18 1458    Specimen:  Blood Updated:  02/15/18 1519     Glucose 113 (H) mg/dL      BUN 50 (H) mg/dL      Creatinine 3.59 (H) mg/dL      Sodium 139  mmol/L      Potassium 4.0 mmol/L      Chloride 97 (L) mmol/L      CO2 25.0 mmol/L      Calcium 9.0 mg/dL      eGFR Non African Amer 17 (L) mL/min/1.73      BUN/Creatinine Ratio 13.9     Anion Gap 17.0 (H) mmol/L     Narrative:       GFR Normal >60  Chronic Kidney Disease <60  Kidney Failure <15    Urinalysis With / Culture If Indicated - Urine, Clean Catch [[id number]]  (Abnormal) Collected:  02/15/18 1504    Specimen:  Urine from Urine, Clean Catch Updated:  02/15/18 1533     Color, UA Yellow     Appearance, UA Cloudy (A)     pH, UA <=5.0     Specific Gravity, UA 1.024     Glucose, UA >=1000 mg/dL (3+) (A)     Ketones, UA Trace (A)     Bilirubin, UA Negative     Blood, UA Negative     Protein, UA Trace (A)     Leuk Esterase, UA Trace (A)     Nitrite, UA Negative     Urobilinogen, UA 0.2 E.U./dL    Urinalysis, Microscopic Only - Urine, Clean Catch [[id number]]  (Abnormal) Collected:  02/15/18 1504    Specimen:  Urine from Urine, Clean Catch Updated:  02/15/18 1533     RBC, UA None Seen /HPF      WBC, UA 0-2 (A) /HPF      Bacteria, UA None Seen /HPF      Squamous Epithelial Cells, UA 0-2 /HPF      Hyaline Casts, UA None Seen /LPF      Calcium Oxalate Crystals, UA Small/1+ /HPF      Mucus, UA Trace /HPF      Methodology Manual Light Microscopy    POC Glucose Once [[id number]]  (Normal) Collected:  02/15/18 1841    Specimen:  Blood Updated:  02/15/18 1852     Glucose 103 mg/dL       : [id number] Kye Redman ID: [id number]       Osmolality, Serum [[id number]]  (Normal) Collected:  02/15/18 1458    Specimen:  Blood Updated:  02/15/18 1857     Osmolality 296 mOsm/kg     Creatinine, Urine, Random - Urine, Clean Catch [[id number]] Collected:  02/15/18 1957    Specimen:  Urine from Urine, Clean Catch Updated:  02/15/18 2019     Creatinine, Urine 96.1 mg/dL     Sodium, Urine, Random - Urine, Clean Catch [[id number]]  (Abnormal) Collected:  02/15/18 1957    Specimen:  Urine from Urine, Clean Catch Updated:  02/15/18  2019     Sodium, Urine 97 (H) mmol/L     Urea Nitrogen, Urine - Urine, Clean Catch [[id number]] Collected:  02/15/18 1957    Specimen:  Urine from Urine, Clean Catch Updated:  02/15/18 2019     Urea Nitrogen, Urine 387 mg/dL     Osmolality, Urine - Urine, Clean Catch [[id number]]  (Abnormal) Collected:  02/15/18 1957    Specimen:  Urine from Urine, Clean Catch Updated:  02/15/18 2033     Osmolality, Urine 391 (L) mOsm/kg     POC Glucose Once [[id number]]  (Abnormal) Collected:  02/15/18 2101    Specimen:  Blood Updated:  02/15/18 2114     Glucose 182 (H) mg/dL       : [id number] Juana TrammellMeter ID: [id number]       CBC Auto Differential [[id number]]  (Abnormal) Collected:  02/16/18 0512    Specimen:  Blood Updated:  02/16/18 0526     WBC 9.71 10*3/mm3      RBC 4.11 (L) 10*6/mm3      Hemoglobin 12.6 (L) g/dL      Hematocrit 36.4 (L) %      MCV 88.6 fL      MCH 30.7 pg      MCHC 34.6 g/dL      RDW 13.8 %      RDW-SD 44.2 fl      MPV 12.9 (H) fL      Platelets 143 10*3/mm3      Neutrophil % 58.8 %      Lymphocyte % 29.2 %      Monocyte % 9.9 %      Eosinophil % 1.3 %      Basophil % 0.5 %      Immature Grans % 0.3 %      Neutrophils, Absolute 5.70 10*3/mm3      Lymphocytes, Absolute 2.84 10*3/mm3      Monocytes, Absolute 0.96 10*3/mm3      Eosinophils, Absolute 0.13 10*3/mm3      Basophils, Absolute 0.05 10*3/mm3      Immature Grans, Absolute 0.03 10*3/mm3      nRBC 0.0 /100 WBC     Comprehensive Metabolic Panel [[id number]]  (Abnormal) Collected:  02/16/18 0512    Specimen:  Blood Updated:  02/16/18 0553     Glucose 138 (H) mg/dL      BUN 40 (H) mg/dL      Creatinine 1.92 (H) mg/dL      Sodium 142 mmol/L      Potassium 4.0 mmol/L      Chloride 105 mmol/L      CO2 26.0 mmol/L      Calcium 8.7 mg/dL      Total Protein 6.5 g/dL      Albumin 3.50 g/dL      ALT (SGPT) 45 U/L      AST (SGOT) 30 U/L      Alkaline Phosphatase 50 U/L      Total Bilirubin 0.8 mg/dL      eGFR Non African Amer 35 (L) mL/min/1.73      Globulin  3.0 gm/dL      A/G Ratio 1.2 g/dL      BUN/Creatinine Ratio 20.8     Anion Gap 11.0 mmol/L     TSH [[id number]]  (Normal) Collected:  02/16/18 0512    Specimen:  Blood Updated:  02/16/18 0608     TSH 1.300 mIU/mL     Hemoglobin A1c [[id number]] Collected:  02/16/18 0512    Specimen:  Blood Updated:  02/16/18 0618     Hemoglobin A1C 8.1 %     Narrative:       Less than 6.0           Non-Diabetic Range  6.0-7.0                 ADA Therapeutic Target  Greater than 7.0        Action Suggested    CBC (No Diff) [[id number]]  (Abnormal) Collected:  02/17/18 0417    Specimen:  Blood Updated:  02/17/18 0517     WBC 9.79 10*3/mm3      RBC 4.16 (L) 10*6/mm3      Hemoglobin 12.5 (L) g/dL      Hematocrit 37.2 (L) %      MCV 89.4 fL      MCH 30.0 pg      MCHC 33.6 g/dL      RDW 13.3 %      RDW-SD 43.7 fl      MPV 13.1 (H) fL      Platelets 149 10*3/mm3     Basic Metabolic Panel [[id number]]  (Abnormal) Collected:  02/17/18 0417    Specimen:  Blood Updated:  02/17/18 0538     Glucose 145 (H) mg/dL      BUN 29 (H) mg/dL      Creatinine 1.10 mg/dL      Sodium 144 mmol/L      Potassium 3.8 mmol/L      Chloride 103 mmol/L      CO2 29.0 mmol/L      Calcium 8.9 mg/dL      eGFR Non African Amer 67 mL/min/1.73      BUN/Creatinine Ratio 26.4 (H)     Anion Gap 12.0 mmol/L     Narrative:       GFR Normal >60  Chronic Kidney Disease <60  Kidney Failure <15    Urine Culture - Urine, Urine, Clean Catch [[id number]]  (Abnormal) Collected:  02/15/18 1504    Specimen:  Urine from Urine, Clean Catch Updated:  02/17/18 0655     Urine Culture --      30,000-40,000 CFU/mL Mixed Gram Positive Aruna (A)    Narrative:       Probable contaminant        Imaging Results (all)     Procedure Component Value Units Date/Time    CT Abdomen Pelvis Without Contrast [[id number]] Collected:  02/15/18 1550     Updated:  02/15/18 1558    Narrative:       EXAMINATION: CT ABDOMEN PELVIS WO CONTRAST-  2/15/2018 3:50 PM CST     HISTORY: Bilateral flank pain      COMPARISON:None     TECHNIQUE:  Radiation dose equals DLP blank mGy-cm.  Automated exposure  control dose reduction technique was implemented.     Thin section axial imaging was obtained. 2-D sagittal and coronal  reconstruction images were generated.     Intravenous contrast was not administered.     Oral contrast was not ingested.     FINDINGS:     There are no urinary tract calculi. There is no pelvocaliectasis or  hydroureter or obstructive uropathy changes. The urinary bladder is  minimally distended without focal bladder wall abnormality. Prostate  gland is not enlarged.     The lung bases are grossly clear.     There is no CT evidence of gallstones. There are no focal hepatic  lesions.     The spleen is not enlarged.     There are no adrenal masses or pancreatic lesions.     Changes from right hemicolectomy observed. Stool and gas appreciated in  the colon which is not dilated. There is no diverticular changes. The  small bowel is not dilated.     The duodenal sweep imaged appropriately. The stomach is not distended.     A retroaortic left renal vein is identified, normal variant.     There is mild atherosclerotic aortoiliac calcifications.     There are no pathologically enlarged lymph nodes.     Spondylosis changes noted in the thoracolumbar spine.     Small fat-containing periumbilical hernia changes noted without bowel  involvement.       Impression:       1. No urinary tract calculi or obstructive uropathy changes.  2. No CT evidence of acute intra-abdominal/pelvic pathological process.  This report was finalized on 02/15/2018 15:55 by Dr. Amish Eastman MD.    US Renal Bilateral [[id number]] Collected:  02/16/18 1053     Updated:  02/16/18 1058    Narrative:          History:   62-year-old evaluated for acute renal failure.     Reference:  CT abdomen pelvis 02/15/2018     Findings:  Renal ultrasound was performed.     Both kidneys are normal in size, contour, and cortical  thickness/echogenicity. No  renal cyst or mass. No hydronephrosis. Right  renal length is approximately 11.7 cm. Left renal length is  approximately 12.9 cm. Urinary bladder not well evaluated as it is  underdistended.       Impression:       No sonographic abnormality of either kidney.  This report was finalized on 02/16/2018 10:55 by  Saul Nichols, .        Chief Complaint on Day of Discharge: Back pain    History of Present Illness on Day of Discharge: The patient is resting in bed. He still has some complaints of intermittent back pain, but he has not required pain medication. He has no new complaints. He is eager for discharge home today.    Hospital Course:  Mr. Duggan is a 62-year-old  male who follows Dr. Eliezer Bruner in [place], [place] for primary care.  He has a past medical history significant for hypertension, hyperlipidemia, hypothyroidism, gastroesophageal reflux disease, and diabetes mellitus type II. The patient presented to the [facility] emergency department on 02/15/2018 with complaints of inability to void since the night before presentation. He also complained of intermittent aching back pain present for 2-3 days prior to presentation.He has no complaints of dysuria, frequency, or urgency.  He had one episode of vomiting prior to presentation, described as nonbloody and nonbilious.  He also had a slight temperature at home, highest reaching 99.7.    In the emergency department, the patient was found to have an acute kidney injury with no prior history of known chronic kidney disease.  BUN was 50 and creatinine was 3.59.  He exhibits slight leukocytosis with a white blood cell count of 13.0.  Urinalysis showed trace leuk esterase, trace protein, trace ketones, 3+ glucose, and small calcium oxalate crystals.  CT of the abdomen and pelvis showed no acute intra-abdominal/pelvic pathological process, nor urinary tract calculi/ obstructive uropathy changes or prostate gland enlargement.  The patient was  "admitted to the hospitalist service for further evaluation and management.    The patient was given aggressive IV fluid hydration.  Fena was calculated at 1.4%, indicating intrinsic renal failure, likely acute tubular necrosis.  The patient was started on Jardiance in the last 3 months, and his dose has been titrated upward since initiation.  This is thought to be a contributing factor to his acute kidney injury.  The patient is also on lisinopril and indapamide.  His kidney function has improved greatly with IV fluid hydration, and today he is very near his baseline with a BUN of 29 and creatinine of 1.10.  A bilateral renal ultrasound was also performed which showed no acute abnormalities either kidney.  The patient's leukocytosis has resolved, and he has been afebrile since admission, therefore there is nothing to suggest an acute infection at this time.  His urine culture shows a probable contaminant with mixed gram-positive jennifer.    The patient is overall clinically improved and hemodynamically stable and therefore appropriate for discharge today.  We have made numerous changes in his medication regimen.  He will need to stop the Jardiance and Indapamide.  His metformin has been changed from 3 times daily to twice daily, and we have added low-dose Glucotrol as well.  His lisinopril will be decreased from 40 mg daily to 20 mg daily.  He will need to follow-up with his primary care provider in 1 week with a BMP.    Condition on Discharge:  Stable, improved    Physical Exam on Discharge:  /70 (BP Location: Left arm, Patient Position: Lying)  Pulse 78  Temp 97.9 °F (36.6 °C) (Oral)   Resp 16  Ht 177.8 cm (70\")  Wt 113 kg (250 lb 1.6 oz)  SpO2 93%  BMI 35.89 kg/m2  Physical Exam   Constitutional: He is oriented to person, place, and time. He appears well-developed and well-nourished. No distress.   HENT:   Head: Normocephalic and atraumatic.   Neck: Normal range of motion. Neck supple. "   Cardiovascular: Normal rate, regular rhythm, normal heart sounds and intact distal pulses.  Exam reveals no gallop and no friction rub.    No murmur heard.  Pulmonary/Chest: Effort normal and breath sounds normal. He has no wheezes. He has no rales.   Abdominal: Soft. Bowel sounds are normal. He exhibits no distension. There is no tenderness.   Musculoskeletal: Normal range of motion. He exhibits no edema.   Neurological: He is alert and oriented to person, place, and time.   Skin: Skin is warm and dry.   Psychiatric: He has a normal mood and affect. His behavior is normal. Judgment and thought content normal.   Vitals reviewed.    Discharge Disposition:  Home or Self Care    Discharge Medications:   Malvin Duggan   Home Medication Instructions MELLY:[id number]    Printed on:02/17/18 0801   Medication Information                      atorvastatin (LIPITOR) 20 MG tablet  Take 20 mg by mouth Daily.             gabapentin (NEURONTIN) 300 MG capsule  Take 300 mg by mouth 3 (Three) Times a Day.             glipiZIDE (GLUCOTROL) 5 MG tablet  Take 1 tablet by mouth Daily.             levothyroxine (SYNTHROID, LEVOTHROID) 75 MCG tablet  Take 75 mcg by mouth Daily.             lisinopril (PRINIVIL,ZESTRIL) 40 MG tablet  Take 0.5 tablets by mouth Daily.             metFORMIN (GLUCOPHAGE) 500 MG tablet  Take 1 tablet by mouth 2 (Two) Times a Day With Meals.             sertraline (ZOLOFT) 100 MG tablet  Take 100 mg by mouth Daily.               Discharge Diet:   Diet Instructions     Diet: Consistent Carbohydrate, Renal       Discharge Diet:   Consistent Carbohydrate  Renal      Stay well hydrated               Activity at Discharge:   Activity Instructions     Activity as Tolerated           Measure Blood Pressure       At least once daily               Discharge Care Plan/Instructions:   1.  Return for any acute or worsening symptoms  2.  Stay well hydrated  3.  With changes in medications, the patient has been advised  to measure his blood pressure at least once per day and check his blood sugar more frequently throughout the day, as he has not been doing either regularly.   4.  Discontinue Indapamide and Jardiance  5.  Metformin change from three times per day to twice daily, and 5 mg Glipizide daily added to regimen  6.  Lisinopril decreased from 40 mg to 20 mg    Follow-up Appointments:   1.  Dr. Eliezer Bruner in 1 week with BMP    Test Results Pending at Discharge: None    JOSUÉ Lea  02/17/18  8:01 AM    Time: 35 minutes    I personally evaluated and examined the patient in conjunction with JOSUÉ Hu and agree with the assessment, treatment plan, and disposition of the patient as recorded by her. My history, exam, and further recommendations are:     Looks very well.  His wife is present with him.  He is anxious to go home.  His serum creatinine is now normal.  We will make medication changes as above.  He will follow with his primary care provider in one week with a BMP.    Davonte Carvalho DO  02/17/18  9:29 AM

## 2018-02-17 NOTE — PLAN OF CARE
Problem: Patient Care Overview (Adult)  Goal: Plan of Care Review  Outcome: Ongoing (interventions implemented as appropriate)  ivf cont. Denies pain. Voiding without difficulty. Tolerates diet. Monitor and treat bld sugar per order. Cont monitor lab.    02/17/18 0238   Coping/Psychosocial Response Interventions   Plan Of Care Reviewed With patient   Patient Care Overview   Progress improving     Goal: Adult Individualization and Mutuality  Outcome: Ongoing (interventions implemented as appropriate)    Goal: Discharge Needs Assessment  Outcome: Ongoing (interventions implemented as appropriate)      Problem: Renal Failure/Kidney Injury, Acute (Adult)  Goal: Signs and Symptoms of Listed Potential Problems Will be Absent or Manageable (Renal Failure/Kidney Injury, Acute)  Outcome: Ongoing (interventions implemented as appropriate)

## 2018-02-18 NOTE — PAYOR COMM NOTE
"DC HOME 2-17-18  [id number]  UR  [phone]    Malvin Duggan (62 y.o. Male)     Date of Birth Social Security Number Address Home Phone MRN    1955  [address] [place] [phone] [id number]    Hinduism Marital Status          Unknown        Admission Date Admission Type Admitting Provider Attending Provider Department, Room/Bed    2/15/18 Emergency Davonte Carvalho DO  [facility] 3C, 374/1    Discharge Date Discharge Disposition Discharge Destination        2/17/2018 Home or Self Care             Attending Provider: (none)    Allergies:  Doxycycline    Isolation:  None   Infection:  None   Code Status:  Prior    Ht:  177.8 cm (70\")   Wt:  113 kg (250 lb 1.6 oz)    Admission Cmt:  None   Principal Problem:  None                Active Insurance as of 2/15/2018     Primary Coverage     Payor Plan Insurance Group Employer/Plan Group    [employer] [facility] [employer] [id number]     Payor Plan Address Payor Plan Phone Number Effective From Effective To    [address] [phone] 11/24/2015     [place] [address]       Subscriber Name Subscriber Birth Date Member ID       ADALID MCGEE 10/4/1960 [id number]                 Emergency Contacts      (Rel.) Home Phone Work Phone Mobile Phone    Patti Mcgee (Spouse) -- -- [phone]               Discharge Summary      Davonte Carvalho DO at 2/17/2018  8:01 AM              [facility]  DISCHARGE SUMMARY       Date of Admission: 2/15/2018  Date of Discharge:  2/17/2018  Primary Care Physician: Dr. Eliezer Bruner    Presenting Problem/History of Present Illness:  Decreased urination.     Final Discharge Diagnoses:  1.  Acute kidney injury, without previous history of known kidney disease. Creatinine in September 2017 was 0.82. Fena of 1.4%- intrinsic renal failure- likely ATN  2.  Leukocytosis, resolved  3.  Normocytic anemia, stable  4. "  Non-insulin dependent Diabetes mellitus type II, hgb A1C 8.1  5.  History of Hypertension, slightly hypotensive since admission  6.  Hyperlipidemia  7.  Hypothyroidism  8.  Obesity, BMI 35.9    Consults: None    Procedures Performed: None    Pertinent Test Results:   Lab Results (all)     Procedure Component Value Units Date/Time    CBC Auto Differential [[id number]]  (Abnormal) Collected:  02/15/18 1458    Specimen:  Blood Updated:  02/15/18 1515     WBC 13.00 (H) 10*3/mm3      RBC 4.22 (L) 10*6/mm3      Hemoglobin 12.7 (L) g/dL      Hematocrit 37.8 (L) %      MCV 89.6 fL      MCH 30.1 pg      MCHC 33.6 g/dL      RDW 13.9 %      RDW-SD 45.1 fl      MPV 12.8 (H) fL      Platelets 166 10*3/mm3      nRBC 0.0 /100 WBC     Narrative:       ckd    Manual Differential [[id number]]  (Abnormal) Collected:  02/15/18 1458    Specimen:  Blood Updated:  02/15/18 1515     Neutrophil % 68.0 %      Lymphocyte % 24.0 %      Monocyte % 7.0 %      Eosinophil % 1.0 %      Neutrophils Absolute 8.84 (H) 10*3/mm3      Lymphocytes Absolute 3.12 10*3/mm3      Monocytes Absolute 0.91 10*3/mm3      Eosinophils Absolute 0.13 10*3/mm3      RBC Morphology Normal     WBC Morphology Normal     Platelet Estimate Adequate    Basic Metabolic Panel [[id number]]  (Abnormal) Collected:  02/15/18 1458    Specimen:  Blood Updated:  02/15/18 1519     Glucose 113 (H) mg/dL      BUN 50 (H) mg/dL      Creatinine 3.59 (H) mg/dL      Sodium 139 mmol/L      Potassium 4.0 mmol/L      Chloride 97 (L) mmol/L      CO2 25.0 mmol/L      Calcium 9.0 mg/dL      eGFR Non African Amer 17 (L) mL/min/1.73      BUN/Creatinine Ratio 13.9     Anion Gap 17.0 (H) mmol/L     Narrative:       GFR Normal >60  Chronic Kidney Disease <60  Kidney Failure <15    Urinalysis With / Culture If Indicated - Urine, Clean Catch [[id number]]  (Abnormal) Collected:  02/15/18 1504    Specimen:  Urine from Urine, Clean Catch Updated:  02/15/18 1533     Color, UA Yellow     Appearance, UA Cloudy (A)      pH, UA <=5.0     Specific Gravity, UA 1.024     Glucose, UA >=1000 mg/dL (3+) (A)     Ketones, UA Trace (A)     Bilirubin, UA Negative     Blood, UA Negative     Protein, UA Trace (A)     Leuk Esterase, UA Trace (A)     Nitrite, UA Negative     Urobilinogen, UA 0.2 E.U./dL    Urinalysis, Microscopic Only - Urine, Clean Catch [[id number]]  (Abnormal) Collected:  02/15/18 1504    Specimen:  Urine from Urine, Clean Catch Updated:  02/15/18 1533     RBC, UA None Seen /HPF      WBC, UA 0-2 (A) /HPF      Bacteria, UA None Seen /HPF      Squamous Epithelial Cells, UA 0-2 /HPF      Hyaline Casts, UA None Seen /LPF      Calcium Oxalate Crystals, UA Small/1+ /HPF      Mucus, UA Trace /HPF      Methodology Manual Light Microscopy    POC Glucose Once [[id number]]  (Normal) Collected:  02/15/18 1841    Specimen:  Blood Updated:  02/15/18 1852     Glucose 103 mg/dL       : [id number] Redman ID: [id number]       Osmolality, Serum [[id number]]  (Normal) Collected:  02/15/18 1458    Specimen:  Blood Updated:  02/15/18 1857     Osmolality 296 mOsm/kg     Creatinine, Urine, Random - Urine, Clean Catch [[id number]] Collected:  02/15/18 1957    Specimen:  Urine from Urine, Clean Catch Updated:  02/15/18 2019     Creatinine, Urine 96.1 mg/dL     Sodium, Urine, Random - Urine, Clean Catch [[id number]]  (Abnormal) Collected:  02/15/18 1957    Specimen:  Urine from Urine, Clean Catch Updated:  02/15/18 2019     Sodium, Urine 97 (H) mmol/L     Urea Nitrogen, Urine - Urine, Clean Catch [[id number]] Collected:  02/15/18 1957    Specimen:  Urine from Urine, Clean Catch Updated:  02/15/18 2019     Urea Nitrogen, Urine 387 mg/dL     Osmolality, Urine - Urine, Clean Catch [[id number]]  (Abnormal) Collected:  02/15/18 1957    Specimen:  Urine from Urine, Clean Catch Updated:  02/15/18 2033     Osmolality, Urine 391 (L) mOsm/kg     POC Glucose Once [[id number]]  (Abnormal) Collected:  02/15/18 2101    Specimen:  Blood Updated:   02/15/18 2114     Glucose 182 (H) mg/dL       : [id number] Juana Torres ID: [id number]       CBC Auto Differential [[id number]]  (Abnormal) Collected:  02/16/18 0512    Specimen:  Blood Updated:  02/16/18 0526     WBC 9.71 10*3/mm3      RBC 4.11 (L) 10*6/mm3      Hemoglobin 12.6 (L) g/dL      Hematocrit 36.4 (L) %      MCV 88.6 fL      MCH 30.7 pg      MCHC 34.6 g/dL      RDW 13.8 %      RDW-SD 44.2 fl      MPV 12.9 (H) fL      Platelets 143 10*3/mm3      Neutrophil % 58.8 %      Lymphocyte % 29.2 %      Monocyte % 9.9 %      Eosinophil % 1.3 %      Basophil % 0.5 %      Immature Grans % 0.3 %      Neutrophils, Absolute 5.70 10*3/mm3      Lymphocytes, Absolute 2.84 10*3/mm3      Monocytes, Absolute 0.96 10*3/mm3      Eosinophils, Absolute 0.13 10*3/mm3      Basophils, Absolute 0.05 10*3/mm3      Immature Grans, Absolute 0.03 10*3/mm3      nRBC 0.0 /100 WBC     Comprehensive Metabolic Panel [[id number]]  (Abnormal) Collected:  02/16/18 0512    Specimen:  Blood Updated:  02/16/18 0553     Glucose 138 (H) mg/dL      BUN 40 (H) mg/dL      Creatinine 1.92 (H) mg/dL      Sodium 142 mmol/L      Potassium 4.0 mmol/L      Chloride 105 mmol/L      CO2 26.0 mmol/L      Calcium 8.7 mg/dL      Total Protein 6.5 g/dL      Albumin 3.50 g/dL      ALT (SGPT) 45 U/L      AST (SGOT) 30 U/L      Alkaline Phosphatase 50 U/L      Total Bilirubin 0.8 mg/dL      eGFR Non African Amer 35 (L) mL/min/1.73      Globulin 3.0 gm/dL      A/G Ratio 1.2 g/dL      BUN/Creatinine Ratio 20.8     Anion Gap 11.0 mmol/L     TSH [[id number]]  (Normal) Collected:  02/16/18 0512    Specimen:  Blood Updated:  02/16/18 0608     TSH 1.300 mIU/mL     Hemoglobin A1c [[id number]] Collected:  02/16/18 0512    Specimen:  Blood Updated:  02/16/18 0618     Hemoglobin A1C 8.1 %     Narrative:       Less than 6.0           Non-Diabetic Range  6.0-7.0                 ADA Therapeutic Target  Greater than 7.0        Action Suggested    CBC (No Diff) [[id number]]   (Abnormal) Collected:  02/17/18 0417    Specimen:  Blood Updated:  02/17/18 0517     WBC 9.79 10*3/mm3      RBC 4.16 (L) 10*6/mm3      Hemoglobin 12.5 (L) g/dL      Hematocrit 37.2 (L) %      MCV 89.4 fL      MCH 30.0 pg      MCHC 33.6 g/dL      RDW 13.3 %      RDW-SD 43.7 fl      MPV 13.1 (H) fL      Platelets 149 10*3/mm3     Basic Metabolic Panel [[id number]]  (Abnormal) Collected:  02/17/18 0417    Specimen:  Blood Updated:  02/17/18 0538     Glucose 145 (H) mg/dL      BUN 29 (H) mg/dL      Creatinine 1.10 mg/dL      Sodium 144 mmol/L      Potassium 3.8 mmol/L      Chloride 103 mmol/L      CO2 29.0 mmol/L      Calcium 8.9 mg/dL      eGFR Non African Amer 67 mL/min/1.73      BUN/Creatinine Ratio 26.4 (H)     Anion Gap 12.0 mmol/L     Narrative:       GFR Normal >60  Chronic Kidney Disease <60  Kidney Failure <15    Urine Culture - Urine, Urine, Clean Catch [[id number]]  (Abnormal) Collected:  02/15/18 1504    Specimen:  Urine from Urine, Clean Catch Updated:  02/17/18 0655     Urine Culture --      30,000-40,000 CFU/mL Mixed Gram Positive Aruna (A)    Narrative:       Probable contaminant        Imaging Results (all)     Procedure Component Value Units Date/Time    CT Abdomen Pelvis Without Contrast [[id number]] Collected:  02/15/18 1550     Updated:  02/15/18 1558    Narrative:       EXAMINATION: CT ABDOMEN PELVIS WO CONTRAST-  2/15/2018 3:50 PM CST     HISTORY: Bilateral flank pain     COMPARISON:None     TECHNIQUE:  Radiation dose equals DLP blank mGy-cm.  Automated exposure  control dose reduction technique was implemented.     Thin section axial imaging was obtained. 2-D sagittal and coronal  reconstruction images were generated.     Intravenous contrast was not administered.     Oral contrast was not ingested.     FINDINGS:     There are no urinary tract calculi. There is no pelvocaliectasis or  hydroureter or obstructive uropathy changes. The urinary bladder is  minimally distended without focal bladder wall  abnormality. Prostate  gland is not enlarged.     The lung bases are grossly clear.     There is no CT evidence of gallstones. There are no focal hepatic  lesions.     The spleen is not enlarged.     There are no adrenal masses or pancreatic lesions.     Changes from right hemicolectomy observed. Stool and gas appreciated in  the colon which is not dilated. There is no diverticular changes. The  small bowel is not dilated.     The duodenal sweep imaged appropriately. The stomach is not distended.     A retroaortic left renal vein is identified, normal variant.     There is mild atherosclerotic aortoiliac calcifications.     There are no pathologically enlarged lymph nodes.     Spondylosis changes noted in the thoracolumbar spine.     Small fat-containing periumbilical hernia changes noted without bowel  involvement.       Impression:       1. No urinary tract calculi or obstructive uropathy changes.  2. No CT evidence of acute intra-abdominal/pelvic pathological process.  This report was finalized on 02/15/2018 15:55 by Dr. Amish Eastman MD.    US Renal Bilateral [[id number]] Collected:  02/16/18 1053     Updated:  02/16/18 1058    Narrative:          History:   62-year-old evaluated for acute renal failure.     Reference:  CT abdomen pelvis 02/15/2018     Findings:  Renal ultrasound was performed.     Both kidneys are normal in size, contour, and cortical  thickness/echogenicity. No renal cyst or mass. No hydronephrosis. Right  renal length is approximately 11.7 cm. Left renal length is  approximately 12.9 cm. Urinary bladder not well evaluated as it is  underdistended.       Impression:       No sonographic abnormality of either kidney.  This report was finalized on 02/16/2018 10:55 by  Saul Nichols, .        Chief Complaint on Day of Discharge: Back pain    History of Present Illness on Day of Discharge: The patient is resting in bed. He still has some complaints of intermittent back pain, but he has not required  pain medication. He has no new complaints. He is eager for discharge home today.    Hospital Course:  Mr. Duggan is a 62-year-old  male who follows Dr. Eliezer Bruner in [place], [place] for primary care.  He has a past medical history significant for hypertension, hyperlipidemia, hypothyroidism, gastroesophageal reflux disease, and diabetes mellitus type II. The patient presented to the [facility] emergency department on 02/15/2018 with complaints of inability to void since the night before presentation. He also complained of intermittent aching back pain present for 2-3 days prior to presentation.He has no complaints of dysuria, frequency, or urgency.  He had one episode of vomiting prior to presentation, described as nonbloody and nonbilious.  He also had a slight temperature at home, highest reaching 99.7.    In the emergency department, the patient was found to have an acute kidney injury with no prior history of known chronic kidney disease.  BUN was 50 and creatinine was 3.59.  He exhibits slight leukocytosis with a white blood cell count of 13.0.  Urinalysis showed trace leuk esterase, trace protein, trace ketones, 3+ glucose, and small calcium oxalate crystals.  CT of the abdomen and pelvis showed no acute intra-abdominal/pelvic pathological process, nor urinary tract calculi/ obstructive uropathy changes or prostate gland enlargement.  The patient was admitted to the hospitalist service for further evaluation and management.    The patient was given aggressive IV fluid hydration.  Fena was calculated at 1.4%, indicating intrinsic renal failure, likely acute tubular necrosis.  The patient was started on Jardiance in the last 3 months, and his dose has been titrated upward since initiation.  This is thought to be a contributing factor to his acute kidney injury.  The patient is also on lisinopril and indapamide.  His kidney function has improved greatly with IV fluid hydration, and today  "he is very near his baseline with a BUN of 29 and creatinine of 1.10.  A bilateral renal ultrasound was also performed which showed no acute abnormalities either kidney.  The patient's leukocytosis has resolved, and he has been afebrile since admission, therefore there is nothing to suggest an acute infection at this time.  His urine culture shows a probable contaminant with mixed gram-positive jennifer.    The patient is overall clinically improved and hemodynamically stable and therefore appropriate for discharge today.  We have made numerous changes in his medication regimen.  He will need to stop the Jardiance and Indapamide.  His metformin has been changed from 3 times daily to twice daily, and we have added low-dose Glucotrol as well.  His lisinopril will be decreased from 40 mg daily to 20 mg daily.  He will need to follow-up with his primary care provider in 1 week with a BMP.    Condition on Discharge:  Stable, improved    Physical Exam on Discharge:  /70 (BP Location: Left arm, Patient Position: Lying)  Pulse 78  Temp 97.9 °F (36.6 °C) (Oral)   Resp 16  Ht 177.8 cm (70\")  Wt 113 kg (250 lb 1.6 oz)  SpO2 93%  BMI 35.89 kg/m2  Physical Exam   Constitutional: He is oriented to person, place, and time. He appears well-developed and well-nourished. No distress.   HENT:   Head: Normocephalic and atraumatic.   Neck: Normal range of motion. Neck supple.   Cardiovascular: Normal rate, regular rhythm, normal heart sounds and intact distal pulses.  Exam reveals no gallop and no friction rub.    No murmur heard.  Pulmonary/Chest: Effort normal and breath sounds normal. He has no wheezes. He has no rales.   Abdominal: Soft. Bowel sounds are normal. He exhibits no distension. There is no tenderness.   Musculoskeletal: Normal range of motion. He exhibits no edema.   Neurological: He is alert and oriented to person, place, and time.   Skin: Skin is warm and dry.   Psychiatric: He has a normal mood and affect. " His behavior is normal. Judgment and thought content normal.   Vitals reviewed.    Discharge Disposition:  Home or Self Care    Discharge Medications:   Malvin Duggan   Home Medication Instructions MELLY:[id number]    Printed on:02/17/18 0801   Medication Information                      atorvastatin (LIPITOR) 20 MG tablet  Take 20 mg by mouth Daily.             gabapentin (NEURONTIN) 300 MG capsule  Take 300 mg by mouth 3 (Three) Times a Day.             glipiZIDE (GLUCOTROL) 5 MG tablet  Take 1 tablet by mouth Daily.             levothyroxine (SYNTHROID, LEVOTHROID) 75 MCG tablet  Take 75 mcg by mouth Daily.             lisinopril (PRINIVIL,ZESTRIL) 40 MG tablet  Take 0.5 tablets by mouth Daily.             metFORMIN (GLUCOPHAGE) 500 MG tablet  Take 1 tablet by mouth 2 (Two) Times a Day With Meals.             sertraline (ZOLOFT) 100 MG tablet  Take 100 mg by mouth Daily.               Discharge Diet:   Diet Instructions     Diet: Consistent Carbohydrate, Renal       Discharge Diet:   Consistent Carbohydrate  Renal      Stay well hydrated               Activity at Discharge:   Activity Instructions     Activity as Tolerated           Measure Blood Pressure       At least once daily               Discharge Care Plan/Instructions:   1.  Return for any acute or worsening symptoms  2.  Stay well hydrated  3.  With changes in medications, the patient has been advised to measure his blood pressure at least once per day and check his blood sugar more frequently throughout the day, as he has not been doing either regularly.   4.  Discontinue Indapamide and Jardiance  5.  Metformin change from three times per day to twice daily, and 5 mg Glipizide daily added to regimen  6.  Lisinopril decreased from 40 mg to 20 mg    Follow-up Appointments:   1.  Dr. Eliezer Bruner in 1 week with BMP    Test Results Pending at Discharge: None    JOSUÉ Lea  02/17/18  8:01 AM    Time: 35 minutes    I personally evaluated and  examined the patient in conjunction with JOSUÉ Hu and agree with the assessment, treatment plan, and disposition of the patient as recorded by her. My history, exam, and further recommendations are:     Looks very well.  His wife is present with him.  He is anxious to go home.  His serum creatinine is now normal.  We will make medication changes as above.  He will follow with his primary care provider in one week with a BMP.    Davonte Carvalho DO  02/17/18  9:29 AM         Electronically signed by Davonte Carvalho DO at 2/17/2018  9:30 AM

## 2018-06-10 ENCOUNTER — OFFICE VISIT (OUTPATIENT)
Dept: RETAIL CLINIC | Facility: CLINIC | Age: 63
End: 2018-06-10

## 2018-06-10 VITALS
DIASTOLIC BLOOD PRESSURE: 74 MMHG | HEART RATE: 75 BPM | RESPIRATION RATE: 16 BRPM | BODY MASS INDEX: 35.45 KG/M2 | OXYGEN SATURATION: 98 % | HEIGHT: 70 IN | WEIGHT: 247.6 LBS | TEMPERATURE: 97.9 F | SYSTOLIC BLOOD PRESSURE: 104 MMHG

## 2018-06-10 DIAGNOSIS — L50.9 URTICARIA: Primary | ICD-10-CM

## 2018-06-10 PROCEDURE — 99213 OFFICE O/P EST LOW 20 MIN: CPT | Performed by: NURSE PRACTITIONER

## 2018-06-10 RX ORDER — METHYLPREDNISOLONE 4 MG/1
TABLET ORAL
Qty: 1 EACH | Refills: 0 | Status: SHIPPED | OUTPATIENT
Start: 2018-06-10 | End: 2021-04-19

## 2018-06-10 NOTE — PROGRESS NOTES
Chief Complaint   Patient presents with   • Rash     Subjective   Malvin Duggan is a 62 y.o. male who presents to the clinic today with complaints of itchy rash. He has itchy red whelps which fade and then return. Worse when out in the heat or after shower.   Rash   This is a new problem. Episode onset: about a week ago. The problem has been waxing and waning since onset. The affected locations include the left arm, right arm, back, left upper leg and right upper leg. The rash is characterized by itchiness. Associated with: denies exposure to new detergents/soaps/clothes/food/meds. Has had recent tick bite. Was out in woods a few days prior. Pertinent negatives include no anorexia, congestion, cough, diarrhea, eye pain, facial edema, fatigue, fever, joint pain, rhinorrhea, shortness of breath, sore throat or vomiting. Treatments tried: Calamine, oral Benadryl. The treatment provided mild relief.     He reports removing a tick from his umbilicus 3-4 days before the rash appeared.  It was the same day he had been out in the [employer].  His wife reports the tick was removed the same day he got it and was not engorged.  He denies any fever, chills, headache, flu like symptoms or joint pain.      He has an appt to see his PCP later this week.  Blood sugar this afternoon 141 (checked in clinic)    Current Outpatient Prescriptions:   •  atorvastatin (LIPITOR) 20 MG tablet, Take 20 mg by mouth Daily., Disp: , Rfl:   •  gabapentin (NEURONTIN) 300 MG capsule, Take 300 mg by mouth 3 (Three) Times a Day., Disp: , Rfl:   •  glipiZIDE (GLUCOTROL) 5 MG tablet, Take 1 tablet by mouth Daily., Disp: 30 tablet, Rfl: 0  •  levothyroxine (SYNTHROID, LEVOTHROID) 75 MCG tablet, Take 75 mcg by mouth Daily., Disp: , Rfl:   •  lisinopril (PRINIVIL,ZESTRIL) 40 MG tablet, Take 0.5 tablets by mouth Daily., Disp: 30 tablet, Rfl: 0  •  metFORMIN (GLUCOPHAGE) 500 MG tablet, Take 1 tablet by mouth 2 (Two) Times a Day With Meals., Disp: 60 tablet,  "Rfl: 0  •  sertraline (ZOLOFT) 100 MG tablet, Take 100 mg by mouth Daily., Disp: , Rfl:       Allergies:  Doxycycline    Past Medical History:   Diagnosis Date   • Black lung disease    • Diabetes mellitus     Type 2   • Disease of thyroid gland    • GERD (gastroesophageal reflux disease)    • Hypertension    • Leukocytosis 8/23/2017   • Neck nodule 8/23/2017     Past Surgical History:   Procedure Laterality Date   • COLON SURGERY      colon resection & polyp removed   • ELBOW ARTHROSCOPY     • SHOULDER ARTHROTOMY     • THUMB ARTHROSCOPY       No family history on file.  Social History   Substance Use Topics   • Smoking status: Never Smoker   • Smokeless tobacco: Not on file   • Alcohol use No       Review of Systems  Review of Systems   Constitutional: Negative for chills, fatigue and fever.   HENT: Negative for congestion, rhinorrhea, sore throat and trouble swallowing.    Eyes: Negative for pain.   Respiratory: Negative for cough, shortness of breath and wheezing.    Gastrointestinal: Negative for abdominal pain, anorexia, diarrhea and vomiting.   Musculoskeletal: Negative.  Negative for joint pain.   Skin: Positive for rash.   Neurological: Negative for headaches.       Objective   /74 (BP Location: Left arm, Patient Position: Sitting, Cuff Size: Adult)   Pulse 75   Temp 97.9 °F (36.6 °C) (Oral)   Resp 16   Ht 177.8 cm (70\")   Wt 112 kg (247 lb 9.6 oz)   SpO2 98%   BMI 35.53 kg/m²       Physical Exam   Constitutional: He is oriented to person, place, and time. He appears well-developed and well-nourished. He is cooperative.   HENT:   Head: Normocephalic and atraumatic.   Right Ear: External ear normal.   Left Ear: External ear normal.   Nose: Nose normal.   Mouth/Throat: Uvula is midline, oropharynx is clear and moist and mucous membranes are normal.   Eyes: Conjunctivae, EOM and lids are normal. Pupils are equal, round, and reactive to light.   Cardiovascular: Normal rate, regular rhythm, S1 " normal, S2 normal and normal heart sounds.    Pulmonary/Chest: Effort normal and breath sounds normal. He has no wheezes. He has no rhonchi. He has no rales.   Neurological: He is alert and oriented to person, place, and time. Coordination and gait normal.   Skin: Skin is warm, dry and intact. Rash (urticarial rash noted to both upper arms and low back. Upon arrival rash was very prominent red whelps which faded some once he cooled off. No evidence of infection) noted.   Umbilicus with small scab. No swelling, warmth, drainage or other evidence of infection.   Psychiatric: He has a normal mood and affect. His speech is normal and behavior is normal.   Vitals reviewed.      Assessment/Plan     Malvin was seen today for rash.    Diagnoses and all orders for this visit:    Urticaria    Other orders  -     MethylPREDNISolone (MEDROL, SHELLY,) 4 MG tablet; Take as directed on package instructions.    ~Risk and benefits of steroids discussed. Take only as prescribed.    ~Steroids can increase blood pressure and blood sugar. PLEASE MONITOR YOUR BLOOD SUGAR.  ~Steroids can cause increased irritability, increased energy & appetite, and may disrupt sleep.   ~They may cause sun sensitivity. You should avoid prolonged sun exposure and use sunscreen if outdoors.   ~Do not take additional NSAIDS such as ibuprofen, Motrin, Advil, Aleve, Naproxen while taking this medicine.  It is okay to take Tylenol while taking steroids.    Follow up with your primary care provider this week as scheduled.  See him sooner if worse.

## 2018-06-10 NOTE — PATIENT INSTRUCTIONS
Hives  Hives (urticaria) are itchy, red, swollen areas on your skin. Hives can appear on any part of your body and can vary in size. They can be as small as the tip of a pen or much larger. Hives often fade within 24 hours (acute hives). In other cases, new hives appear after old ones fade. This cycle can continue for several days or weeks (chronic hives).  Hives result from your body's reaction to an irritant or to something that you are allergic to (trigger). When you are exposed to a trigger, your body releases a chemical (histamine) that causes redness, itching, and swelling. You can get hives immediately after being exposed to a trigger or hours later.  Hives do not spread from person to person (are not contagious). Your hives may get worse with scratching, exercise, and emotional stress.  What are the causes?  Causes of this condition include:  · Allergies to certain foods or ingredients.  · Insect bites or stings.  · Exposure to pollen or pet dander.  · Contact with latex or chemicals.  · Spending time in sunlight, heat, or cold (exposure).  · Exercise.  · Stress.  You can also get hives from some medical conditions and treatments. These include:  · Viruses, including the common cold.  · Bacterial infections, such as urinary tract infections and strep throat.  · Disorders such as vasculitis, lupus, or thyroid disease.  · Certain medications.  · Allergy shots.  · Blood transfusions.  Sometimes, the cause of hives is not known (idiopathic hives).  What increases the risk?  This condition is more likely to develop in:  · Women.  · People who have food allergies, especially to citrus fruits, milk, eggs, peanuts, tree nuts, or shellfish.  · People who are allergic to:  ¨ Medicines.  ¨ Latex.  ¨ Insects.  ¨ Animals.  ¨ Pollen.  · People who have certain medical conditions, including lupus or thyroid disease.  What are the signs or symptoms?  The main symptom of this condition is raised, itchy red or white bumps or  patches on your skin. These areas may:  · Become large and swollen (welts).  · Change in shape and location, quickly and repeatedly.  · Be separate hives or connect over a large area of skin.  · Sting or become painful.  · Turn white when pressed in the center (johana).  In severe cases, your hands, feet, and face may also become swollen. This may occur if hives develop deeper in your skin.  How is this diagnosed?  This condition is diagnosed based on your symptoms, medical history, and physical exam. Your skin, urine, or blood may be tested to find out what is causing your hives and to rule out other health issues. Your health care provider may also remove a small sample of skin from the affected area and examine it under a microscope (biopsy).  How is this treated?  Treatment depends on the severity of your condition. Your health care provider may recommend using cool, wet cloths (cool compresses) or taking cool showers to relieve itching. Hives are sometimes treated with medicines, including:  · Antihistamines.  · Corticosteroids.  · Antibiotics.  · An injectable medicine (omalizumab). Your health care provider may prescribe this if you have chronic idiopathic hives and you continue to have symptoms even after treatment with antihistamines.  Severe cases may require an emergency injection of adrenaline (epinephrine) to prevent a life-threatening allergic reaction (anaphylaxis).  Follow these instructions at home:  Medicines   · Take or apply over-the-counter and prescription medicines only as told by your health care provider.  · If you were prescribed an antibiotic medicine, use it as told by your health care provider. Do not stop taking the antibiotic even if you start to feel better.  Skin Care   · Apply cool compresses to the affected areas.  · Do not scratch or rub your skin.  General instructions   · Do not take hot showers or baths. This can make itching worse.  · Do not wear tight-fitting clothing.  · Use  sunscreen and wear protective clothing when you are outside.  · Avoid any substances that cause your hives. Keep a journal to help you track what causes your hives. Write down:  ¨ What medicines you take.  ¨ What you eat and drink.  ¨ What products you use on your skin.  · Keep all follow-up visits as told by your health care provider. This is important.  Contact a health care provider if:  · Your symptoms are not controlled with medicine.  · Your joints are painful or swollen.  Get help right away if:  · You have a fever.  · You have pain in your abdomen.  · Your tongue or lips are swollen.  · Your eyelids are swollen.  · Your chest or throat feels tight.  · You have trouble breathing or swallowing.  These symptoms may represent a serious problem that is an emergency. Do not wait to see if the symptoms will go away. Get medical help right away. Call your local emergency services (911 in the [place] Do not drive yourself to the hospital.  This information is not intended to replace advice given to you by your health care provider. Make sure you discuss any questions you have with your health care provider.  Document Released: 12/18/2006 Document Revised: 05/17/2017 Document Reviewed: 10/05/2016  [employer] Patient Education © 2017 [employer]    ~Risk and benefits of steroids discussed. Take only as prescribed.    ~Steroids can increase blood pressure and blood sugar. PLEASE MONITOR YOUR BLOOD SUGAR.  ~Steroids can cause increased irritability, increased energy & appetite, and may disrupt sleep.   ~They may cause sun sensitivity. You should avoid prolonged sun exposure and use sunscreen if outdoors.   ~Do not take additional NSAIDS such as ibuprofen, Motrin, Advil, Aleve, Naproxen while taking this medicine.  It is okay to take Tylenol while taking steroids.    Follow up with your primary care provider this week as scheduled.  See him sooner if worse.

## 2018-10-17 ENCOUNTER — OFFICE VISIT (OUTPATIENT)
Dept: RETAIL CLINIC | Facility: CLINIC | Age: 63
End: 2018-10-17

## 2018-10-17 DIAGNOSIS — Z23 NEED FOR IMMUNIZATION AGAINST INFLUENZA: Primary | ICD-10-CM

## 2018-10-17 NOTE — PROGRESS NOTES
Patient presented requesting flu shot. Denies having problems with flu shots in the past. See administration note and scanned [employer] forms.

## 2019-08-23 ENCOUNTER — HOSPITAL ENCOUNTER (OUTPATIENT)
Dept: GENERAL RADIOLOGY | Facility: HOSPITAL | Age: 64
Discharge: HOME | End: 2019-08-23
Admitting: PHYSICIAN ASSISTANT

## 2019-08-23 ENCOUNTER — TRANSCRIBE ORDERS (OUTPATIENT)
Dept: ADMINISTRATIVE | Facility: HOSPITAL | Age: 64
End: 2019-08-23

## 2019-08-23 DIAGNOSIS — J01.90 ACUTE SINUSITIS, RECURRENCE NOT SPECIFIED, UNSPECIFIED LOCATION: Primary | ICD-10-CM

## 2019-08-23 DIAGNOSIS — R05.9 COUGH: ICD-10-CM

## 2019-08-23 DIAGNOSIS — J01.90 ACUTE SINUSITIS, RECURRENCE NOT SPECIFIED, UNSPECIFIED LOCATION: ICD-10-CM

## 2019-08-23 DIAGNOSIS — I10 ESSENTIAL (PRIMARY) HYPERTENSION: ICD-10-CM

## 2019-08-23 DIAGNOSIS — E78.5 HYPERLIPIDEMIA, UNSPECIFIED HYPERLIPIDEMIA TYPE: ICD-10-CM

## 2019-08-23 DIAGNOSIS — R07.89 OTHER CHEST PAIN: ICD-10-CM

## 2019-08-23 DIAGNOSIS — R07.9 CHEST PAIN, UNSPECIFIED TYPE: Primary | ICD-10-CM

## 2019-08-23 PROCEDURE — 71046 X-RAY EXAM CHEST 2 VIEWS: CPT

## 2019-08-26 ENCOUNTER — HOSPITAL ENCOUNTER (OUTPATIENT)
Dept: CARDIOLOGY | Facility: HOSPITAL | Age: 64
Discharge: HOME | End: 2019-08-26
Admitting: FAMILY MEDICINE

## 2019-08-26 VITALS
WEIGHT: 242 LBS | HEIGHT: 70 IN | HEART RATE: 64 BPM | BODY MASS INDEX: 34.65 KG/M2 | DIASTOLIC BLOOD PRESSURE: 82 MMHG | SYSTOLIC BLOOD PRESSURE: 114 MMHG

## 2019-08-26 DIAGNOSIS — R07.9 CHEST PAIN, UNSPECIFIED TYPE: ICD-10-CM

## 2019-08-26 DIAGNOSIS — E78.5 HYPERLIPIDEMIA, UNSPECIFIED HYPERLIPIDEMIA TYPE: ICD-10-CM

## 2019-08-26 DIAGNOSIS — R07.89 OTHER CHEST PAIN: ICD-10-CM

## 2019-08-26 DIAGNOSIS — I10 ESSENTIAL (PRIMARY) HYPERTENSION: ICD-10-CM

## 2019-08-26 PROCEDURE — 93352 ADMIN ECG CONTRAST AGENT: CPT | Performed by: INTERNAL MEDICINE

## 2019-08-26 PROCEDURE — 25010000002 PERFLUTREN 6.52 MG/ML SUSPENSION: Performed by: INTERNAL MEDICINE

## 2019-08-26 PROCEDURE — 93018 CV STRESS TEST I&R ONLY: CPT | Performed by: INTERNAL MEDICINE

## 2019-08-26 PROCEDURE — 93017 CV STRESS TEST TRACING ONLY: CPT

## 2019-08-26 PROCEDURE — 93350 STRESS TTE ONLY: CPT

## 2019-08-26 PROCEDURE — 93350 STRESS TTE ONLY: CPT | Performed by: INTERNAL MEDICINE

## 2019-08-26 RX ORDER — TRAMADOL HYDROCHLORIDE 50 MG/1
50 TABLET ORAL EVERY 8 HOURS PRN
COMMUNITY

## 2019-08-26 RX ORDER — TRIAMTERENE AND HYDROCHLOROTHIAZIDE 37.5; 25 MG/1; MG/1
1 TABLET ORAL DAILY
COMMUNITY

## 2019-08-26 RX ORDER — ELETRIPTAN HYDROBROMIDE 40 MG/1
40 TABLET, FILM COATED ORAL ONCE AS NEEDED
COMMUNITY

## 2019-08-26 RX ORDER — CYCLOBENZAPRINE HCL 10 MG
10 TABLET ORAL NIGHTLY PRN
COMMUNITY

## 2019-08-26 RX ADMIN — PERFLUTREN 8.48 MG: 6.52 INJECTION, SUSPENSION INTRAVENOUS at 14:15

## 2019-08-27 LAB
BH CV STRESS BP STAGE 1: NORMAL
BH CV STRESS BP STAGE 2: NORMAL
BH CV STRESS DURATION MIN STAGE 1: 3
BH CV STRESS DURATION MIN STAGE 2: 3
BH CV STRESS DURATION MIN STAGE 3: 0
BH CV STRESS DURATION SEC STAGE 1: 0
BH CV STRESS DURATION SEC STAGE 2: 0
BH CV STRESS DURATION SEC STAGE 3: 45
BH CV STRESS GRADE STAGE 1: 10
BH CV STRESS GRADE STAGE 2: 12
BH CV STRESS GRADE STAGE 3: 14
BH CV STRESS HR STAGE 1: 105
BH CV STRESS HR STAGE 2: 123
BH CV STRESS HR STAGE 3: 139
BH CV STRESS METS STAGE 1: 5
BH CV STRESS METS STAGE 2: 7.5
BH CV STRESS METS STAGE 3: 10
BH CV STRESS PROTOCOL 1: NORMAL
BH CV STRESS RECOVERY BP: NORMAL MMHG
BH CV STRESS RECOVERY HR: 71 BPM
BH CV STRESS SPEED STAGE 1: 1.7
BH CV STRESS SPEED STAGE 2: 2.5
BH CV STRESS SPEED STAGE 3: 3.4
BH CV STRESS STAGE 1: 1
BH CV STRESS STAGE 2: 2
BH CV STRESS STAGE 3: 3
MAXIMAL PREDICTED HEART RATE: 155 BPM
PERCENT MAX PREDICTED HR: 89.68 %
STRESS BASELINE BP: NORMAL MMHG
STRESS BASELINE HR: 64 BPM
STRESS PERCENT HR: 106 %
STRESS POST ESTIMATED WORKLOAD: 10 METS
STRESS POST EXERCISE DUR MIN: 6 MIN
STRESS POST EXERCISE DUR SEC: 45 SEC
STRESS POST PEAK BP: NORMAL MMHG
STRESS POST PEAK HR: 139 BPM
STRESS TARGET HR: 132 BPM

## 2021-03-09 ENCOUNTER — IMMUNIZATION (OUTPATIENT)
Dept: VACCINE CLINIC | Facility: HOSPITAL | Age: 66
End: 2021-03-09

## 2021-03-09 PROCEDURE — 0011A: CPT | Performed by: OBSTETRICS & GYNECOLOGY

## 2021-03-09 PROCEDURE — 91301 HC SARSCO02 VAC 100MCG/0.5ML IM: CPT | Performed by: OBSTETRICS & GYNECOLOGY

## 2021-04-06 ENCOUNTER — IMMUNIZATION (OUTPATIENT)
Dept: VACCINE CLINIC | Facility: HOSPITAL | Age: 66
End: 2021-04-06

## 2021-04-06 PROCEDURE — 91301 HC SARSCO02 VAC 100MCG/0.5ML IM: CPT | Performed by: OBSTETRICS & GYNECOLOGY

## 2021-04-06 PROCEDURE — 0012A: CPT | Performed by: OBSTETRICS & GYNECOLOGY

## 2021-04-19 ENCOUNTER — OFFICE VISIT (OUTPATIENT)
Dept: GASTROENTEROLOGY | Facility: CLINIC | Age: 66
End: 2021-04-19

## 2021-04-19 VITALS
OXYGEN SATURATION: 98 % | SYSTOLIC BLOOD PRESSURE: 122 MMHG | HEART RATE: 89 BPM | HEIGHT: 70 IN | DIASTOLIC BLOOD PRESSURE: 82 MMHG | TEMPERATURE: 97.5 F | BODY MASS INDEX: 34.5 KG/M2 | WEIGHT: 241 LBS

## 2021-04-19 DIAGNOSIS — Z86.010 HISTORY OF ADENOMATOUS POLYP OF COLON: Primary | ICD-10-CM

## 2021-04-19 PROBLEM — Z86.0101 HISTORY OF ADENOMATOUS POLYP OF COLON: Status: ACTIVE | Noted: 2021-04-19

## 2021-04-19 PROCEDURE — S0260 H&P FOR SURGERY: HCPCS | Performed by: NURSE PRACTITIONER

## 2021-04-19 RX ORDER — SODIUM, POTASSIUM,MAG SULFATES 17.5-3.13G
1 SOLUTION, RECONSTITUTED, ORAL ORAL EVERY 12 HOURS
Qty: 1 ML | Refills: 0 | Status: ON HOLD | OUTPATIENT
Start: 2021-04-19 | End: 2021-04-28

## 2021-04-19 RX ORDER — SITAGLIPTIN 100 MG/1
1 TABLET, FILM COATED ORAL DAILY
COMMUNITY
Start: 2021-02-13

## 2021-04-19 NOTE — PROGRESS NOTES
Chief Complaint   Patient presents with   • Colon Cancer Screening     Pt presents today for evaluation for colonoscopy-pt's last colon was 11/28/2016 by Dr. Aleman; Personal history of colon polyps     Subjective   HPI  Malvin Duggan is a 65 y.o. male who presents as a referral for preventative maintenance. He has no complaints of nausea or vomiting. No change in bowels. No wt loss. No BRBPR. No melena. There is no family hx for colon cancer. No abdominal pain. There was no Cologuard screening this year.  The patient's last colonoscopy was performed with polyp.  in [place] with findings of polyp near anastomosis on 11/28/2016.  Patient has had a history of colectomy partial/total in [facility] for large polyp that was noted in the wall.  Pathology revealed no no evidence of malignancy 2015.    Past Medical History:   Diagnosis Date   • Black lung disease (CMS/HCC)    • GERD (gastroesophageal reflux disease)    • History of colon polyps    • Hypercholesterolemia    • Hypertension    • Hypothyroidism    • Leukocytosis 8/23/2017   • Migraines    • Neck nodule 8/23/2017   • Type 2 diabetes mellitus (CMS/HCC)      Past Surgical History:   Procedure Laterality Date   • CARDIAC CATHETERIZATION     • COLECTOMY PARTIAL / TOTAL Right 2015    For large polyp that was in wall per pt   • COLONOSCOPY  11/28/2016    Dr. Aleman-Small 0.3cm polyp near anastamosis-path shows fragment of benign small intestinal mucosa; Otherwise normal; Repeat 5 years   • ELBOW ARTHROSCOPY     • FOOT SURGERY Right    • SHOULDER ARTHROTOMY     • THUMB ARTHROSCOPY  2013    Tendon repair   • WRIST SURGERY      Chipped bone       Current Outpatient Medications:   •  atorvastatin (LIPITOR) 20 MG tablet, Take 20 mg by mouth Daily., Disp: , Rfl:   •  cyclobenzaprine (FLEXERIL) 10 MG tablet, Take 10 mg by mouth At Night As Needed for Muscle Spasms., Disp: , Rfl:   •  eletriptan (RELPAX) 40 MG tablet, Take 40 mg by mouth 1 (One) Time As Needed for  Migraine. may repeat in 2 hours if necessary, Disp: , Rfl:   •  gabapentin (NEURONTIN) 300 MG capsule, Take 300 mg by mouth 3 (Three) Times a Day., Disp: , Rfl:   •  glipiZIDE 5 MG tablet 5 mg, metFORMIN 500 MG tablet 500 mg, Take 2 doses by mouth 3 (Three) Times a Day After Meals., Disp: , Rfl:   •  Januvia 100 MG tablet, Take 1 tablet by mouth Daily., Disp: , Rfl:   •  levothyroxine (SYNTHROID, LEVOTHROID) 75 MCG tablet, Take 75 mcg by mouth Daily., Disp: , Rfl:   •  lisinopril (PRINIVIL,ZESTRIL) 40 MG tablet, Take 0.5 tablets by mouth Daily., Disp: 30 tablet, Rfl: 0  •  sertraline (ZOLOFT) 100 MG tablet, Take 100 mg by mouth Daily., Disp: , Rfl:   •  SILDENAFIL CITRATE PO, Take 100 mg by mouth Take As Directed., Disp: , Rfl:   •  traMADol (ULTRAM) 50 MG tablet, Take 50 mg by mouth Every 8 (Eight) Hours As Needed for Moderate Pain ., Disp: , Rfl:   •  triamterene-hydrochlorothiazide (MAXZIDE-25) 37.5-25 MG per tablet, Take 1 tablet by mouth Daily., Disp: , Rfl:   •  sodium-potassium-magnesium sulfates (Suprep Bowel Prep Kit) 17.5-3.13-1.6 GM/177ML solution oral solution, Take 1 bottle by mouth Every 12 (Twelve) Hours. Split dose prep as directed by office instructions provided.  2 bottles = one kit., Disp: 1 mL, Rfl: 0  Allergies   Allergen Reactions   • Doxycycline Anaphylaxis and Swelling   • Jardiance [Empagliflozin] Other (See Comments)     Renal failure   • Victoza [Liraglutide] Other (See Comments)     Pancreatitis     Social History     Socioeconomic History   • Marital status:      Spouse name: Not on file   • Number of children: Not on file   • Years of education: Not on file   • Highest education level: Not on file   Tobacco Use   • Smoking status: Never Smoker   • Smokeless tobacco: Never Used   Vaping Use   • Vaping Use: Never used   Substance and Sexual Activity   • Alcohol use: Yes     Comment: Rarely   • Drug use: No   • Sexual activity: Defer     Family History   Problem Relation Age of  "Onset   • Esophageal cancer Mother    • Esophageal cancer Father    • Heart disease Sister    • Colon cancer Neg Hx    • Colon polyps Neg Hx    • Liver cancer Neg Hx    • Liver disease Neg Hx    • Stomach cancer Neg Hx        REVIEW OF SYSTEMS  General: well appearing, no fever chills or sweats, no unexplained wt loss  HEENT: no acute visual or hearing disturbances  Cardiovascular: No chest pain or palpitations  Pulmonary: No shortness of breath, coughing, wheezing or hemoptysis  : No burning, urgency, hematuria, or dysuria  Musculoskeletal: No joint pain or stiffness  Peripheral: no edema  Skin: No lesions or rashes  Neuro: No dizziness, headaches, stroke, syncope  Endocrine: No hot or cold intolerances  Hematological: No blood dyscrasias    Objective   Vitals:    04/19/21 0924   BP: 122/82   BP Location: Left arm   Patient Position: Sitting   Cuff Size: Adult   Pulse: 89   Temp: 97.5 °F (36.4 °C)   TempSrc: Infrared   SpO2: 98%   Weight: 109 kg (241 lb)   Height: 177.8 cm (70\")     Body mass index is 34.58 kg/m².    PHYSICAL EXAM  General: age appropriate well nourished well appearing, no acute distress  Head: normocephalic and atraumatic  Global assessment-supple  Neck-No JVD noted, no lymphadenopathy  Pulmonary-clear to auscultation bilaterally, normal respiratory effort  Cardiovascular-normal rate and rhythm, normal heart sounds, S1 and S2 noted  Abdomen-soft, non tender, non distended, normal bowel sounds all 4 quadrants, no hepatosplenomegaly noted  Extremities-No clubbing cyanosis or edema  Neuro-Non focal, converses appropriately, awake, alert, oriented        Imaging Results (Most Recent)     None        Assessment/Plan   Diagnoses and all orders for this visit:    1. History of adenomatous polyp of colon (Primary)  -     Case Request; Standing  -     Case Request    Other orders  -     Follow Anesthesia Guidelines / Protocol; Future  -     Obtain Informed Consent; Future  -     Implement Anesthesia " Orders Day of Procedure; Standing  -     Obtain Informed Consent; Standing  -     Verify bowel prep was successful; Standing  -     sodium-potassium-magnesium sulfates (Suprep Bowel Prep Kit) 17.5-3.13-1.6 GM/177ML solution oral solution; Take 1 bottle by mouth Every 12 (Twelve) Hours. Split dose prep as directed by office instructions provided.  2 bottles = one kit.  Dispense: 1 mL; Refill: 0      COLONOSCOPY WITH ANESTHESIA (N/A)       Body mass index is 34.58 kg/m². Patient's Body mass index is 34.58 kg/m². BMI is above normal parameters. Recommendations include: nutrition counseling.      All risks, benefits, alternatives, and indications of colonoscopy procedure have been discussed with the patient. Risks to include perforation of the colon requiring possible surgery or colostomy, risk of bleeding from biopsies or removal of colon tissue, possibility of missing a colon polyp or cancer, or adverse drug reaction.  Benefits to include the diagnosis and management of disease of the colon and rectum. Alternatives to include barium enema, radiographic evaluation, lab testing or no intervention. Pt verbalizes understanding and agrees.

## 2021-04-23 ENCOUNTER — TRANSCRIBE ORDERS (OUTPATIENT)
Dept: GASTROENTEROLOGY | Facility: CLINIC | Age: 66
End: 2021-04-23

## 2021-04-23 DIAGNOSIS — Z01.818 PRE-OP TESTING: Primary | ICD-10-CM

## 2021-04-26 ENCOUNTER — LAB (OUTPATIENT)
Dept: LAB | Facility: HOSPITAL | Age: 66
End: 2021-04-26

## 2021-04-26 LAB — SARS-COV-2 ORF1AB RESP QL NAA+PROBE: NOT DETECTED

## 2021-04-26 PROCEDURE — C9803 HOPD COVID-19 SPEC COLLECT: HCPCS | Performed by: INTERNAL MEDICINE

## 2021-04-26 PROCEDURE — U0004 COV-19 TEST NON-CDC HGH THRU: HCPCS | Performed by: INTERNAL MEDICINE

## 2021-04-28 ENCOUNTER — ANESTHESIA EVENT (OUTPATIENT)
Dept: GASTROENTEROLOGY | Facility: HOSPITAL | Age: 66
End: 2021-04-28

## 2021-04-28 ENCOUNTER — ANESTHESIA (OUTPATIENT)
Dept: GASTROENTEROLOGY | Facility: HOSPITAL | Age: 66
End: 2021-04-28

## 2021-04-28 ENCOUNTER — HOSPITAL ENCOUNTER (OUTPATIENT)
Facility: HOSPITAL | Age: 66
Setting detail: HOSPITAL OUTPATIENT SURGERY
Discharge: HOME | End: 2021-04-28
Attending: INTERNAL MEDICINE | Admitting: INTERNAL MEDICINE

## 2021-04-28 VITALS
BODY MASS INDEX: 33.36 KG/M2 | HEIGHT: 70 IN | RESPIRATION RATE: 12 BRPM | DIASTOLIC BLOOD PRESSURE: 82 MMHG | SYSTOLIC BLOOD PRESSURE: 114 MMHG | HEART RATE: 64 BPM | OXYGEN SATURATION: 93 % | TEMPERATURE: 96.7 F | WEIGHT: 233 LBS

## 2021-04-28 DIAGNOSIS — Z86.010 HISTORY OF ADENOMATOUS POLYP OF COLON: ICD-10-CM

## 2021-04-28 PROCEDURE — 45385 COLONOSCOPY W/LESION REMOVAL: CPT | Performed by: INTERNAL MEDICINE

## 2021-04-28 PROCEDURE — 25010000002 PROPOFOL 10 MG/ML EMULSION: Performed by: NURSE ANESTHETIST, CERTIFIED REGISTERED

## 2021-04-28 PROCEDURE — 88305 TISSUE EXAM BY PATHOLOGIST: CPT | Performed by: INTERNAL MEDICINE

## 2021-04-28 RX ORDER — SODIUM CHLORIDE 9 MG/ML
500 INJECTION, SOLUTION INTRAVENOUS CONTINUOUS PRN
Status: DISCONTINUED | OUTPATIENT
Start: 2021-04-28 | End: 2021-04-28 | Stop reason: HOSPADM

## 2021-04-28 RX ORDER — PROPOFOL 10 MG/ML
VIAL (ML) INTRAVENOUS AS NEEDED
Status: DISCONTINUED | OUTPATIENT
Start: 2021-04-28 | End: 2021-04-28 | Stop reason: SURG

## 2021-04-28 RX ORDER — SODIUM CHLORIDE 0.9 % (FLUSH) 0.9 %
10 SYRINGE (ML) INJECTION AS NEEDED
Status: DISCONTINUED | OUTPATIENT
Start: 2021-04-28 | End: 2021-04-28 | Stop reason: HOSPADM

## 2021-04-28 RX ORDER — LIDOCAINE HYDROCHLORIDE 20 MG/ML
INJECTION, SOLUTION EPIDURAL; INFILTRATION; INTRACAUDAL; PERINEURAL AS NEEDED
Status: DISCONTINUED | OUTPATIENT
Start: 2021-04-28 | End: 2021-04-28 | Stop reason: SURG

## 2021-04-28 RX ADMIN — SODIUM CHLORIDE 500 ML: 9 INJECTION, SOLUTION INTRAVENOUS at 07:48

## 2021-04-28 RX ADMIN — LIDOCAINE HYDROCHLORIDE 100 MG: 20 INJECTION, SOLUTION EPIDURAL; INFILTRATION; INTRACAUDAL; PERINEURAL at 08:52

## 2021-04-28 RX ADMIN — PROPOFOL 220 MG: 10 INJECTION, EMULSION INTRAVENOUS at 08:52

## 2021-04-28 NOTE — ANESTHESIA PREPROCEDURE EVALUATION
Anesthesia Evaluation     Patient summary reviewed   no history of anesthetic complications:  NPO Solid Status: > 8 hours  NPO Liquid Status: > 2 hours           Airway   Mallampati: I  TM distance: >3 FB  Neck ROM: full  Dental          Pulmonary    (-) asthma, sleep apnea, not a smoker    ROS comment: Black lung disease  Cardiovascular   Exercise tolerance: good (4-7 METS)    (+) hypertension, hyperlipidemia,   (-) past MI, CAD, dysrhythmias, cardiac stents      Neuro/Psych  (-) seizures, TIA, CVA  GI/Hepatic/Renal/Endo    (+)  GERD,  diabetes mellitus,   (-) liver disease, no renal disease    Musculoskeletal     Abdominal    Substance History      OB/GYN          Other                        Anesthesia Plan    ASA 2     MAC     intravenous induction     Anesthetic plan, all risks, benefits, and alternatives have been provided, discussed and informed consent has been obtained with: patient.

## 2021-04-28 NOTE — ANESTHESIA POSTPROCEDURE EVALUATION
Patient: Malvin Duggan    Procedure Summary     Date: 04/28/21 Room / Location: [facility] ENDOSCOPY 6 / [facility]    Anesthesia Start: 0849 Anesthesia Stop: 0907    Procedure: COLONOSCOPY WITH ANESTHESIA (N/A ) Diagnosis:       History of adenomatous polyp of colon      (History of adenomatous polyp of colon [Z86.010])    Surgeons: Manuela Ramos MD Provider: Viv Gibbons CRNA    Anesthesia Type: MAC ASA Status: 2          Anesthesia Type: MAC    Vitals  Vitals Value Taken Time   /82 04/28/21 0925   Temp     Pulse 65 04/28/21 0930   Resp 12 04/28/21 0925   SpO2 93 % 04/28/21 0930   Vitals shown include unvalidated device data.        Post Anesthesia Care and Evaluation    Patient location during evaluation: PHASE II  Patient participation: complete - patient participated  Level of consciousness: awake and alert  Pain management: adequate  Airway patency: patent  Anesthetic complications: No anesthetic complications  PONV Status: none  Cardiovascular status: acceptable  Respiratory status: acceptable  Hydration status: acceptable  No anesthesia care post op

## 2021-04-29 LAB
CYTO UR: NORMAL
LAB AP CASE REPORT: NORMAL
PATH REPORT.FINAL DX SPEC: NORMAL
PATH REPORT.GROSS SPEC: NORMAL

## 2022-04-26 ENCOUNTER — OFFICE VISIT (OUTPATIENT)
Dept: GASTROENTEROLOGY | Facility: CLINIC | Age: 67
End: 2022-04-26

## 2022-04-26 ENCOUNTER — LAB (OUTPATIENT)
Dept: LAB | Facility: HOSPITAL | Age: 67
End: 2022-04-26

## 2022-04-26 VITALS
HEIGHT: 70 IN | BODY MASS INDEX: 32.93 KG/M2 | SYSTOLIC BLOOD PRESSURE: 136 MMHG | HEART RATE: 69 BPM | DIASTOLIC BLOOD PRESSURE: 82 MMHG | OXYGEN SATURATION: 97 % | TEMPERATURE: 97.5 F | WEIGHT: 230 LBS

## 2022-04-26 DIAGNOSIS — Z01.818 PREOPERATIVE TESTING: Primary | ICD-10-CM

## 2022-04-26 DIAGNOSIS — K21.9 GASTROESOPHAGEAL REFLUX DISEASE, UNSPECIFIED WHETHER ESOPHAGITIS PRESENT: Primary | ICD-10-CM

## 2022-04-26 DIAGNOSIS — Z80.0 FAMILY HISTORY OF ESOPHAGEAL CANCER: ICD-10-CM

## 2022-04-26 DIAGNOSIS — R12 HEARTBURN: ICD-10-CM

## 2022-04-26 LAB — SARS-COV-2 RNA PNL SPEC NAA+PROBE: NOT DETECTED

## 2022-04-26 PROCEDURE — C9803 HOPD COVID-19 SPEC COLLECT: HCPCS | Performed by: NURSE PRACTITIONER

## 2022-04-26 PROCEDURE — 87635 SARS-COV-2 COVID-19 AMP PRB: CPT | Performed by: NURSE PRACTITIONER

## 2022-04-26 PROCEDURE — 99214 OFFICE O/P EST MOD 30 MIN: CPT | Performed by: NURSE PRACTITIONER

## 2022-04-26 RX ORDER — PANTOPRAZOLE SODIUM 40 MG/1
40 TABLET, DELAYED RELEASE ORAL DAILY
COMMUNITY
Start: 2022-04-11 | End: 2023-03-13 | Stop reason: ALTCHOICE

## 2022-04-26 NOTE — PROGRESS NOTES
"Chief Complaint:   Chief Complaint   Patient presents with   • Heartburn     Pt c/o daily reflux for the last couple of months-was started on Pantoprazole by PCP a couple of weeks ago and thinks that has helped          Patient ID: Malvin Duggan is a 66 y.o. male     History of Present Illness: This is a very pleasant 66-year-old male who is here today with complaints of heartburn.    The patient tells me that for approximately 2 months he has been having \"out of control heartburn that even wakes me up at night.\"  The patient states he has noted that greasy, spicy and acidic foods make it worse with the heartburn and reflux.  The patient denies any chronic NSAID use.  Patient tells me that both his mother and father are  from esophageal cancer.      Past Medical History:   Diagnosis Date   • Black lung disease (HCC)    • GERD (gastroesophageal reflux disease)    • History of colon polyps    • Hypercholesterolemia    • Hypertension    • Hypothyroidism    • Leukocytosis 2017   • Migraines    • Neck nodule 2017   • Type 2 diabetes mellitus (HCC)        Past Surgical History:   Procedure Laterality Date   • CARDIAC CATHETERIZATION     • COLECTOMY PARTIAL / TOTAL Right     For large polyp that was in wall per pt   • COLONOSCOPY  2016    Dr. Aleman-Small 0.3cm polyp near anastamosis-path shows fragment of benign small intestinal mucosa; Otherwise normal; Repeat 5 years   • COLONOSCOPY N/A 2021    Patent end-to-side ileo-colonic anastomosis-Characterized by healthy appearing mucosa; One 4mm hyperplastic polyp in the rectum; Repeat 5 years   • ELBOW ARTHROSCOPY     • FOOT SURGERY Right    • SHOULDER ARTHROTOMY     • THUMB ARTHROSCOPY  2013    Tendon repair   • WRIST SURGERY      Chipped bone         Current Outpatient Medications:   •  atorvastatin (LIPITOR) 20 MG tablet, Take 20 mg by mouth Daily., Disp: , Rfl:   •  cyclobenzaprine (FLEXERIL) 10 MG tablet, Take 10 mg by mouth At Night " As Needed for Muscle Spasms., Disp: , Rfl:   •  eletriptan (RELPAX) 40 MG tablet, Take 40 mg by mouth 1 (One) Time As Needed for Migraine. may repeat in 2 hours if necessary, Disp: , Rfl:   •  glipiZIDE 5 MG tablet 5 mg, metFORMIN 500 MG tablet 500 mg, Take 2 doses by mouth 3 (Three) Times a Day After Meals., Disp: , Rfl:   •  Januvia 100 MG tablet, Take 1 tablet by mouth Daily., Disp: , Rfl:   •  levothyroxine (SYNTHROID, LEVOTHROID) 75 MCG tablet, Take 75 mcg by mouth Daily., Disp: , Rfl:   •  lisinopril (PRINIVIL,ZESTRIL) 40 MG tablet, Take 0.5 tablets by mouth Daily., Disp: 30 tablet, Rfl: 0  •  pantoprazole (PROTONIX) 40 MG EC tablet, Take 40 mg by mouth Daily., Disp: , Rfl:   •  sertraline (ZOLOFT) 100 MG tablet, Take 100 mg by mouth Daily., Disp: , Rfl:   •  SILDENAFIL CITRATE PO, Take 100 mg by mouth Take As Directed., Disp: , Rfl:   •  traMADol (ULTRAM) 50 MG tablet, Take 50 mg by mouth Every 8 (Eight) Hours As Needed for Moderate Pain ., Disp: , Rfl:   •  triamterene-hydrochlorothiazide (MAXZIDE-25) 37.5-25 MG per tablet, Take 1 tablet by mouth Daily., Disp: , Rfl:     Allergies   Allergen Reactions   • Doxycycline Anaphylaxis and Swelling   • Jardiance [Empagliflozin] Other (See Comments)     Renal failure   • Victoza [Liraglutide] Other (See Comments)     Pancreatitis       Social History     Socioeconomic History   • Marital status:    Tobacco Use   • Smoking status: Never Smoker   • Smokeless tobacco: Never Used   Vaping Use   • Vaping Use: Never used   Substance and Sexual Activity   • Alcohol use: Yes     Comment: Rarely   • Drug use: No   • Sexual activity: Defer       Family History   Problem Relation Age of Onset   • Esophageal cancer Mother    • Esophageal cancer Father    • Heart disease Sister    • Colon cancer Neg Hx    • Colon polyps Neg Hx    • Liver cancer Neg Hx    • Liver disease Neg Hx    • Stomach cancer Neg Hx        Vitals:    04/26/22 0836   BP: 136/82   BP Location: Left arm  "  Patient Position: Sitting   Cuff Size: Adult   Pulse: 69   Temp: 97.5 °F (36.4 °C)   TempSrc: Infrared   SpO2: 97%   Weight: 104 kg (230 lb)   Height: 177.8 cm (70\")       Review of Systems:    General:    Present -feeling well   Skin:    Not Present-Rash   HEENT:     Not Present-Acute visual changes or Acute hearing changes   Neck :    Not Present- swollen glands   Genitourinary:      Not Present- burning, frequency, urgency hematuria, dysuria,   Cardiovascular:   Not Present-chest pain, palpitations, or pressure   Respiratory:   Not Present- shortness of breath or cough   Gastrointestinal:  Musculoskeletal:  Neurological:  Psychiatric:   Present as mentioned in the HP    Not Present. Recent gait disturbances.    Not Present-Seizures and weakness in extremities.    Not Present- Anxiety or Depression.       Physical Exam:    General Appearance:    Alert, cooperative, in no acute distress   Psych:    Mood appropriate    Eyes:          conjunctivae and sclerae normal, no   icterus, no pallor   ENMT:    Ears appear intact with no abnormalities noted oral mucosa moist   Neck:   No adenopathy, supple, trachea midline, no thyromegaly, no   carotid bruit, no JVD    Cardiovascular:    Regular rhythm and normal rate, normal S1 and S2, no            murmur, no gallop, no rub, no click   Gastrointestinal:     Inspection normal.  Normal bowel sounds, no masses, no organomegaly, soft round non-tender, non-distended, no guarding, no rebound or tenderness. No hepatosplenomegaly.   Skin:   No bleeding, bruising or rash   Neurologic:   nonfocal       Assessment and Plan:  Assessment/Plan   Diagnoses and all orders for this visit:    1. Gastroesophageal reflux disease, unspecified whether esophagitis present (Primary)  -     Case Request; Standing  -     Case Request    2. Heartburn  -     Case Request; Standing  -     Case Request    3. Family history of esophageal cancer  -     Case Request; Standing  -     Case Request    Other " orders  -     Follow Anesthesia Guidelines / Protocol; Future  -     Obtain Informed Consent; Future        Will schedule patient for EGD.  I have instructed the patient to not to go ahead and take a Protonix before supper.  He understands after much discussion that he needs to take his PPI 30 minutes before eating.  The patient is companied by his wife they both gave verbal understanding of the plan of care and are in agreement.     There are no Patient Instructions on file for this visit.    Next follow-up appointment    The risks, benefits, and alternatives of endoscopy were reviewed with the patient today.  Risks including perforation, with or without dilation, possibly requiring surgery.  Additional risks include risk of bleeding.  There is also the risk of a drug reaction or problems with anesthesia.  This will be discussed with the further by the anesthesia team on the day of the procedure. The benefits include the diagnosis and management of disease of the upper digestive tract.  Alternatives to endoscopy include upper GI series, laboratory testing, radiographic evaluation, or no intervention.  The patient verbalizes understanding and agrees.      EMR Dragon/Transcription disclaimer:  Much of this encounter note is an electronic transcription/translation of spoken language to printed text. The electronic translation of spoken language may permit erroneous, or at times, nonsensical words or phrases to be inadvertently transcribed; although I have reviewed the note for such errors, some may still exist.

## 2022-04-27 ENCOUNTER — HOSPITAL ENCOUNTER (OUTPATIENT)
Facility: HOSPITAL | Age: 67
Setting detail: HOSPITAL OUTPATIENT SURGERY
Discharge: HOME | End: 2022-04-27
Attending: INTERNAL MEDICINE | Admitting: INTERNAL MEDICINE

## 2022-04-27 ENCOUNTER — ANESTHESIA EVENT (OUTPATIENT)
Dept: GASTROENTEROLOGY | Facility: HOSPITAL | Age: 67
End: 2022-04-27

## 2022-04-27 ENCOUNTER — ANESTHESIA (OUTPATIENT)
Dept: GASTROENTEROLOGY | Facility: HOSPITAL | Age: 67
End: 2022-04-27

## 2022-04-27 VITALS
HEART RATE: 60 BPM | SYSTOLIC BLOOD PRESSURE: 139 MMHG | BODY MASS INDEX: 34.65 KG/M2 | OXYGEN SATURATION: 94 % | HEIGHT: 70 IN | RESPIRATION RATE: 13 BRPM | WEIGHT: 242 LBS | TEMPERATURE: 97.9 F | DIASTOLIC BLOOD PRESSURE: 87 MMHG

## 2022-04-27 LAB — GLUCOSE BLDC GLUCOMTR-MCNC: 115 MG/DL (ref 70–130)

## 2022-04-27 PROCEDURE — 82962 GLUCOSE BLOOD TEST: CPT

## 2022-04-27 PROCEDURE — 25010000002 PROPOFOL 10 MG/ML EMULSION: Performed by: NURSE ANESTHETIST, CERTIFIED REGISTERED

## 2022-04-27 PROCEDURE — 43235 EGD DIAGNOSTIC BRUSH WASH: CPT | Performed by: INTERNAL MEDICINE

## 2022-04-27 RX ORDER — LIDOCAINE HYDROCHLORIDE 20 MG/ML
INJECTION, SOLUTION EPIDURAL; INFILTRATION; INTRACAUDAL; PERINEURAL AS NEEDED
Status: DISCONTINUED | OUTPATIENT
Start: 2022-04-27 | End: 2022-04-27 | Stop reason: SURG

## 2022-04-27 RX ORDER — LIDOCAINE HYDROCHLORIDE 10 MG/ML
0.5 INJECTION, SOLUTION EPIDURAL; INFILTRATION; INTRACAUDAL; PERINEURAL ONCE AS NEEDED
Status: DISCONTINUED | OUTPATIENT
Start: 2022-04-27 | End: 2022-04-27 | Stop reason: HOSPADM

## 2022-04-27 RX ORDER — SODIUM CHLORIDE 0.9 % (FLUSH) 0.9 %
10 SYRINGE (ML) INJECTION EVERY 12 HOURS SCHEDULED
Status: CANCELLED | OUTPATIENT
Start: 2022-04-27

## 2022-04-27 RX ORDER — SODIUM CHLORIDE 0.9 % (FLUSH) 0.9 %
10 SYRINGE (ML) INJECTION AS NEEDED
Status: DISCONTINUED | OUTPATIENT
Start: 2022-04-27 | End: 2022-04-27 | Stop reason: HOSPADM

## 2022-04-27 RX ORDER — SODIUM CHLORIDE 9 MG/ML
500 INJECTION, SOLUTION INTRAVENOUS CONTINUOUS PRN
Status: DISCONTINUED | OUTPATIENT
Start: 2022-04-27 | End: 2022-04-27 | Stop reason: HOSPADM

## 2022-04-27 RX ORDER — SODIUM CHLORIDE 9 MG/ML
100 INJECTION, SOLUTION INTRAVENOUS CONTINUOUS
Status: CANCELLED | OUTPATIENT
Start: 2022-04-27

## 2022-04-27 RX ORDER — SODIUM CHLORIDE 0.9 % (FLUSH) 0.9 %
10 SYRINGE (ML) INJECTION AS NEEDED
Status: CANCELLED | OUTPATIENT
Start: 2022-04-27

## 2022-04-27 RX ORDER — PROPOFOL 10 MG/ML
VIAL (ML) INTRAVENOUS AS NEEDED
Status: DISCONTINUED | OUTPATIENT
Start: 2022-04-27 | End: 2022-04-27 | Stop reason: SURG

## 2022-04-27 RX ADMIN — PROPOFOL 30 MG: 10 INJECTION, EMULSION INTRAVENOUS at 10:51

## 2022-04-27 RX ADMIN — PROPOFOL 30 MG: 10 INJECTION, EMULSION INTRAVENOUS at 10:50

## 2022-04-27 RX ADMIN — PROPOFOL 40 MG: 10 INJECTION, EMULSION INTRAVENOUS at 10:49

## 2022-04-27 RX ADMIN — LIDOCAINE HYDROCHLORIDE 100 MG: 20 INJECTION, SOLUTION EPIDURAL; INFILTRATION; INTRACAUDAL; PERINEURAL at 10:48

## 2022-04-27 RX ADMIN — PROPOFOL 70 MG: 10 INJECTION, EMULSION INTRAVENOUS at 10:48

## 2022-04-27 RX ADMIN — SODIUM CHLORIDE 500 ML: 9 INJECTION, SOLUTION INTRAVENOUS at 09:33

## 2022-04-27 NOTE — ANESTHESIA POSTPROCEDURE EVALUATION
Patient: Malvin Duggan    Procedure Summary     Date: 04/27/22 Room / Location: [facility] ENDOSCOPY 5 / [facility]    Anesthesia Start: 1044 Anesthesia Stop: 1057    Procedure: ESOPHAGOGASTRODUODENOSCOPY WITH ANESTHESIA (N/A ) Diagnosis:       Gastroesophageal reflux disease, unspecified whether esophagitis present      Heartburn      Family history of esophageal cancer      (Gastroesophageal reflux disease, unspecified whether esophagitis present [K21.9])      (Heartburn [R12])      (Family history of esophageal cancer [Z80.0])    Surgeons: Manuela Ramos MD Provider: Constantino Vargas CRNA    Anesthesia Type: MAC ASA Status: 2          Anesthesia Type: MAC    Vitals  Vitals Value Taken Time   /82 04/27/22 1101   Temp     Pulse 57 04/27/22 1104   Resp 12 04/27/22 1100   SpO2 95 % 04/27/22 1104   Vitals shown include unvalidated device data.        Post Anesthesia Care and Evaluation    Patient location during evaluation: PHASE II  Patient participation: complete - patient participated  Level of consciousness: awake  Pain score: 0  Pain management: adequate  Airway patency: patent  Anesthetic complications: No anesthetic complications  PONV Status: none  Cardiovascular status: acceptable  Respiratory status: acceptable  Hydration status: acceptable

## 2022-04-27 NOTE — ANESTHESIA PREPROCEDURE EVALUATION
Anesthesia Evaluation     Patient summary reviewed   no history of anesthetic complications:  NPO Solid Status: > 8 hours  NPO Liquid Status: > 2 hours           Airway   Mallampati: I  TM distance: >3 FB  Neck ROM: full  Dental          Pulmonary    (-) asthma, sleep apnea, not a smoker    ROS comment: Black lung disease  Cardiovascular   Exercise tolerance: good (4-7 METS)    (+) hypertension, hyperlipidemia,   (-) pacemaker, past MI, CAD, dysrhythmias, cardiac stents      Neuro/Psych  (-) seizures, TIA, CVA  GI/Hepatic/Renal/Endo    (+)  GERD,  diabetes mellitus, thyroid problem hypothyroidism  (-) liver disease, no renal disease    Musculoskeletal     Abdominal   (+) obese,    Substance History      OB/GYN          Other                          Anesthesia Plan    ASA 2     MAC     intravenous induction     Anesthetic plan, all risks, benefits, and alternatives have been provided, discussed and informed consent has been obtained with: patient and spouse/significant other.

## 2023-01-30 PROBLEM — R74.8 ELEVATED AMYLASE AND LIPASE: Status: ACTIVE | Noted: 2023-01-30

## 2023-03-13 ENCOUNTER — OFFICE VISIT (OUTPATIENT)
Dept: GASTROENTEROLOGY | Facility: CLINIC | Age: 68
End: 2023-03-13
Payer: MEDICARE

## 2023-03-13 ENCOUNTER — TELEPHONE (OUTPATIENT)
Dept: GASTROENTEROLOGY | Facility: CLINIC | Age: 68
End: 2023-03-13
Payer: MEDICARE

## 2023-03-13 ENCOUNTER — LAB (OUTPATIENT)
Dept: LAB | Facility: HOSPITAL | Age: 68
End: 2023-03-13
Payer: MEDICARE

## 2023-03-13 VITALS
BODY MASS INDEX: 35.07 KG/M2 | DIASTOLIC BLOOD PRESSURE: 82 MMHG | HEIGHT: 70 IN | OXYGEN SATURATION: 99 % | SYSTOLIC BLOOD PRESSURE: 126 MMHG | TEMPERATURE: 97.1 F | HEART RATE: 57 BPM | WEIGHT: 245 LBS

## 2023-03-13 DIAGNOSIS — R74.8 ELEVATED AMYLASE AND LIPASE: ICD-10-CM

## 2023-03-13 DIAGNOSIS — Z80.0 FAMILY HISTORY OF ESOPHAGEAL CANCER: ICD-10-CM

## 2023-03-13 DIAGNOSIS — R13.19 ESOPHAGEAL DYSPHAGIA: Primary | ICD-10-CM

## 2023-03-13 DIAGNOSIS — Z86.010 HISTORY OF ADENOMATOUS POLYP OF COLON: ICD-10-CM

## 2023-03-13 LAB
AMYLASE SERPL-CCNC: 81 U/L (ref 28–100)
LIPASE SERPL-CCNC: 68 U/L (ref 13–60)

## 2023-03-13 PROCEDURE — 99214 OFFICE O/P EST MOD 30 MIN: CPT | Performed by: NURSE PRACTITIONER

## 2023-03-13 PROCEDURE — 82150 ASSAY OF AMYLASE: CPT | Performed by: NURSE PRACTITIONER

## 2023-03-13 PROCEDURE — 83690 ASSAY OF LIPASE: CPT | Performed by: NURSE PRACTITIONER

## 2023-03-13 PROCEDURE — 36415 COLL VENOUS BLD VENIPUNCTURE: CPT | Performed by: NURSE PRACTITIONER

## 2023-03-13 RX ORDER — INSULIN DEGLUDEC 200 U/ML
INJECTION, SOLUTION SUBCUTANEOUS DAILY
COMMUNITY
Start: 2023-02-17

## 2023-03-13 RX ORDER — OMEPRAZOLE 20 MG/1
20 CAPSULE, DELAYED RELEASE ORAL DAILY
Status: ON HOLD | COMMUNITY
End: 2023-03-27 | Stop reason: SDUPTHER

## 2023-03-13 NOTE — TELEPHONE ENCOUNTER
Left vm on nurse line at Dr. Bowens's office for pt's most recent fasting lipid to be faxed to us.

## 2023-03-13 NOTE — PROGRESS NOTES
Primary Physician: Shantell Bowens DO    Chief Complaint   Patient presents with   • Abnormal Lab     Pt presents today for evaluation for elevated amylase and lipase-states they were checked in Jan by dermatology and came back elevated        Subjective     Malvin Duggan is a 67 y.o. male.    HPI   Elevated amylase and lipase  Pt had lab work collected recently through dermatology after experiencing a rash to the lower leg and it was at that time amylase and lipase were noted to be abnormal.  Labs January 2023 reveal a minimally elevated amylase and lipase.  Amylase 107 lipase 98 this was collected January 2023    Pt denies any abdominal pain. No nausea and vomiting.   No unexplained weight loss.  Pt and his wife report his triglycerides have been normal in the past.  Pt states about 4-5 years ago his labs were elevated after a round of diabetes medication. But that was stopped and labs resolved.  Denies any alcohol.    Most recent fasting lipid panel collected January 6, 2023 revealed slightly elevated triglycerides at 214 (normal range 0-150).      Personal history of colon polyps  Last colonoscopy April 2021 with a patent end-to-side ileocolonic anastomosis characterized by healthy mucosa.  1 hyperplastic polyp removed.  5-year recall given.      Family history of esophageal cancer  His mother and father both had esophageal cancer.  Last endoscopy 4/27/2022 with a normal-appearing esophagus.  Examination otherwise unremarkable as well with stomach and duodenum appearing healthy.  Pt reports that 2-3 times a day he feels like he is not swallowing well in general. Even saliva is painful.  Can get choked on the most simple things he is eating or drinking.      Past Medical History:   Diagnosis Date   • Black lung disease (HCC)    • GERD (gastroesophageal reflux disease)    • History of colon polyps    • Hypercholesterolemia    • Hypertension    • Hypothyroidism    • Leukocytosis 08/23/2017   • Migraines     • Neck nodule 08/23/2017   • Stroke (HCC)    • Type 2 diabetes mellitus (HCC)        Past Surgical History:   Procedure Laterality Date   • CARDIAC CATHETERIZATION     • COLECTOMY PARTIAL / TOTAL Right 2015    For large polyp that was in wall per pt   • COLONOSCOPY  11/28/2016    Dr. Aleman-Small 0.3cm polyp near anastamosis-path shows fragment of benign small intestinal mucosa; Otherwise normal; Repeat 5 years   • COLONOSCOPY N/A 04/28/2021    Patent end-to-side ileo-colonic anastomosis-Characterized by healthy appearing mucosa; One 4mm hyperplastic polyp in the rectum; Repeat 5 years   • ELBOW ARTHROSCOPY     • ENDOSCOPY N/A 04/27/2022    Normal esophagus; Normal stomach; Normal examined duodenum; No specimens collected   • FOOT SURGERY Right    • SHOULDER ARTHROTOMY     • THUMB ARTHROSCOPY  2013    Tendon repair   • WRIST SURGERY      Chipped bone        Current Outpatient Medications:   •  atorvastatin (LIPITOR) 20 MG tablet, Take 1 tablet by mouth Daily., Disp: , Rfl:   •  cyclobenzaprine (FLEXERIL) 10 MG tablet, Take 1 tablet by mouth At Night As Needed for Muscle Spasms., Disp: , Rfl:   •  eletriptan (RELPAX) 40 MG tablet, Take 1 tablet by mouth 1 (One) Time As Needed for Migraine. may repeat in 2 hours if necessary, Disp: , Rfl:   •  glipiZIDE 5 MG tablet 5 mg, metFORMIN 500 MG tablet 500 mg, Take 2 doses by mouth 3 (Three) Times a Day After Meals., Disp: , Rfl:   •  Januvia 100 MG tablet, Take 1 tablet by mouth Daily., Disp: , Rfl:   •  levothyroxine (SYNTHROID, LEVOTHROID) 75 MCG tablet, Take 1 tablet by mouth Daily., Disp: , Rfl:   •  lisinopril (PRINIVIL,ZESTRIL) 40 MG tablet, Take 0.5 tablets by mouth Daily., Disp: 30 tablet, Rfl: 0  •  omeprazole (priLOSEC) 20 MG capsule, Take 1 capsule by mouth Daily., Disp: , Rfl:   •  sertraline (ZOLOFT) 100 MG tablet, Take 1 tablet by mouth Daily., Disp: , Rfl:   •  SILDENAFIL CITRATE PO, Take 100 mg by mouth Take As Directed., Disp: , Rfl:   •  traMADol  "(ULTRAM) 50 MG tablet, Take 1 tablet by mouth Every 8 (Eight) Hours As Needed for Moderate Pain., Disp: , Rfl:   •  Tresiba FlexTouch 200 UNIT/ML solution pen-injector pen injection, Inject  under the skin into the appropriate area as directed Daily., Disp: , Rfl:   •  triamterene-hydrochlorothiazide (MAXZIDE-25) 37.5-25 MG per tablet, Take 1 tablet by mouth Daily., Disp: , Rfl:     Allergies   Allergen Reactions   • Doxycycline Anaphylaxis and Swelling   • Jardiance [Empagliflozin] Other (See Comments)     Renal failure   • Victoza [Liraglutide] Other (See Comments)     Pancreatitis       Social History     Socioeconomic History   • Marital status:    Tobacco Use   • Smoking status: Never   • Smokeless tobacco: Never   Vaping Use   • Vaping Use: Never used   Substance and Sexual Activity   • Alcohol use: Yes     Comment: Occasionally-Less than once a week-Maybe one beer a month   • Drug use: No   • Sexual activity: Yes     Partners: Female     Comment: Old age       Family History   Problem Relation Age of Onset   • Esophageal cancer Mother    • Esophageal cancer Father    • Heart disease Sister    • Colon cancer Neg Hx    • Colon polyps Neg Hx    • Liver cancer Neg Hx    • Liver disease Neg Hx    • Stomach cancer Neg Hx    • Rectal cancer Neg Hx        Review of Systems   Constitutional: Negative for unexpected weight change.   Respiratory: Negative for shortness of breath.    Cardiovascular: Negative for chest pain.       Objective     /82 (BP Location: Left arm, Patient Position: Sitting, Cuff Size: Adult)   Pulse 57   Temp 97.1 °F (36.2 °C) (Infrared)   Ht 177.8 cm (70\")   Wt 111 kg (245 lb)   SpO2 99%   BMI 35.15 kg/m²     Physical Exam  Vitals reviewed.   Constitutional:       Appearance: Normal appearance.   Cardiovascular:      Rate and Rhythm: Normal rate and regular rhythm.      Heart sounds: Normal heart sounds.   Pulmonary:      Effort: Pulmonary effort is normal.      Breath sounds: " Normal breath sounds.   Neurological:      Mental Status: He is alert.             IMPRESSION/PLAN:    Assessment & Plan      Problem List Items Addressed This Visit        Family History    Family history of esophageal cancer    Overview     Mother and father            Gastrointestinal Abdominal     History of adenomatous polyp of colon    Overview     Last colonoscopy April 2021 with a patent end-to-side ileocolonic anastomosis characterized by healthy mucosa.  1 hyperplastic polyp removed.  5-year recall given.         Elevated amylase and lipase - Primary    Overview     1/6/2023: Minimal elevations amylase 107, lipase 98         Relevant Orders    Amylase    Lipase    US Gallbladder    Esophageal dysphagia    Relevant Medications    omeprazole (priLOSEC) 20 MG capsule     Repeat labs to see where amylase and lipase are today.  Ultrasound of the gallbladder  Would recommend to his PCP to try to get triglycerides more in the normal range, definitely in the differential of causing elevation of amylase and lipase.  Depending on gallbladder ultrasound consider CT scan of the abdomen however I am going to hold at this time given the fact he is completely asymptomatic with no abdominal pain or nausea and vomiting    Endoscopy per Dr Ramos given strong family history of esophageal cancer and recent trouble swallowing we will repeat endoscopy just to be on the safe side.  Recommended continuing Prilosec daily.    ..The risks, benefits, and alternatives of endoscopy were reviewed with the patient today.  Risks including perforation, with or without dilation, possibly requiring surgery.  Additional risks include risk of bleeding.  There is also the risk of a drug reaction or problems with anesthesia.  This will be discussed with the further by the anesthesia team on the day of the procedure. The benefits include the diagnosis and management of disease of the upper digestive tract.  Alternatives to endoscopy include upper  GI series, laboratory testing, radiographic evaluation, or no intervention.  The patient verbalizes understanding and agrees.    In accordance with requirements under the Affordable Care Act, [facility] has provided pricing for all hospital services and items on each of its websites. However, a patient's actual cost may differ based on the services the patient receives to meet individual healthcare needs and based on the benefits provided under the patient’s insurance coverage.                Stacy Hardwick, APRN  03/13/23  10:56 CDT    Part of this note may be an electronic transcription/translation of spoken language to printed text.

## 2023-03-13 NOTE — H&P (VIEW-ONLY)
Primary Physician: Shantell Bowens DO    Chief Complaint   Patient presents with   • Abnormal Lab     Pt presents today for evaluation for elevated amylase and lipase-states they were checked in Jan by dermatology and came back elevated        Subjective     Malvin Duggan is a 67 y.o. male.    HPI   Elevated amylase and lipase  Pt had lab work collected recently through dermatology after experiencing a rash to the lower leg and it was at that time amylase and lipase were noted to be abnormal.  Labs January 2023 reveal a minimally elevated amylase and lipase.  Amylase 107 lipase 98 this was collected January 2023    Pt denies any abdominal pain. No nausea and vomiting.   No unexplained weight loss.  Pt and his wife report his triglycerides have been normal in the past.  Pt states about 4-5 years ago his labs were elevated after a round of diabetes medication. But that was stopped and labs resolved.  Denies any alcohol.    Most recent fasting lipid panel collected January 6, 2023 revealed slightly elevated triglycerides at 214 (normal range 0-150).      Personal history of colon polyps  Last colonoscopy April 2021 with a patent end-to-side ileocolonic anastomosis characterized by healthy mucosa.  1 hyperplastic polyp removed.  5-year recall given.      Family history of esophageal cancer  His mother and father both had esophageal cancer.  Last endoscopy 4/27/2022 with a normal-appearing esophagus.  Examination otherwise unremarkable as well with stomach and duodenum appearing healthy.  Pt reports that 2-3 times a day he feels like he is not swallowing well in general. Even saliva is painful.  Can get choked on the most simple things he is eating or drinking.      Past Medical History:   Diagnosis Date   • Black lung disease (HCC)    • GERD (gastroesophageal reflux disease)    • History of colon polyps    • Hypercholesterolemia    • Hypertension    • Hypothyroidism    • Leukocytosis 08/23/2017   • Migraines     • Neck nodule 08/23/2017   • Stroke (HCC)    • Type 2 diabetes mellitus (HCC)        Past Surgical History:   Procedure Laterality Date   • CARDIAC CATHETERIZATION     • COLECTOMY PARTIAL / TOTAL Right 2015    For large polyp that was in wall per pt   • COLONOSCOPY  11/28/2016    Dr. Aleman-Small 0.3cm polyp near anastamosis-path shows fragment of benign small intestinal mucosa; Otherwise normal; Repeat 5 years   • COLONOSCOPY N/A 04/28/2021    Patent end-to-side ileo-colonic anastomosis-Characterized by healthy appearing mucosa; One 4mm hyperplastic polyp in the rectum; Repeat 5 years   • ELBOW ARTHROSCOPY     • ENDOSCOPY N/A 04/27/2022    Normal esophagus; Normal stomach; Normal examined duodenum; No specimens collected   • FOOT SURGERY Right    • SHOULDER ARTHROTOMY     • THUMB ARTHROSCOPY  2013    Tendon repair   • WRIST SURGERY      Chipped bone        Current Outpatient Medications:   •  atorvastatin (LIPITOR) 20 MG tablet, Take 1 tablet by mouth Daily., Disp: , Rfl:   •  cyclobenzaprine (FLEXERIL) 10 MG tablet, Take 1 tablet by mouth At Night As Needed for Muscle Spasms., Disp: , Rfl:   •  eletriptan (RELPAX) 40 MG tablet, Take 1 tablet by mouth 1 (One) Time As Needed for Migraine. may repeat in 2 hours if necessary, Disp: , Rfl:   •  glipiZIDE 5 MG tablet 5 mg, metFORMIN 500 MG tablet 500 mg, Take 2 doses by mouth 3 (Three) Times a Day After Meals., Disp: , Rfl:   •  Januvia 100 MG tablet, Take 1 tablet by mouth Daily., Disp: , Rfl:   •  levothyroxine (SYNTHROID, LEVOTHROID) 75 MCG tablet, Take 1 tablet by mouth Daily., Disp: , Rfl:   •  lisinopril (PRINIVIL,ZESTRIL) 40 MG tablet, Take 0.5 tablets by mouth Daily., Disp: 30 tablet, Rfl: 0  •  omeprazole (priLOSEC) 20 MG capsule, Take 1 capsule by mouth Daily., Disp: , Rfl:   •  sertraline (ZOLOFT) 100 MG tablet, Take 1 tablet by mouth Daily., Disp: , Rfl:   •  SILDENAFIL CITRATE PO, Take 100 mg by mouth Take As Directed., Disp: , Rfl:   •  traMADol  "(ULTRAM) 50 MG tablet, Take 1 tablet by mouth Every 8 (Eight) Hours As Needed for Moderate Pain., Disp: , Rfl:   •  Tresiba FlexTouch 200 UNIT/ML solution pen-injector pen injection, Inject  under the skin into the appropriate area as directed Daily., Disp: , Rfl:   •  triamterene-hydrochlorothiazide (MAXZIDE-25) 37.5-25 MG per tablet, Take 1 tablet by mouth Daily., Disp: , Rfl:     Allergies   Allergen Reactions   • Doxycycline Anaphylaxis and Swelling   • Jardiance [Empagliflozin] Other (See Comments)     Renal failure   • Victoza [Liraglutide] Other (See Comments)     Pancreatitis       Social History     Socioeconomic History   • Marital status:    Tobacco Use   • Smoking status: Never   • Smokeless tobacco: Never   Vaping Use   • Vaping Use: Never used   Substance and Sexual Activity   • Alcohol use: Yes     Comment: Occasionally-Less than once a week-Maybe one beer a month   • Drug use: No   • Sexual activity: Yes     Partners: Female     Comment: Old age       Family History   Problem Relation Age of Onset   • Esophageal cancer Mother    • Esophageal cancer Father    • Heart disease Sister    • Colon cancer Neg Hx    • Colon polyps Neg Hx    • Liver cancer Neg Hx    • Liver disease Neg Hx    • Stomach cancer Neg Hx    • Rectal cancer Neg Hx        Review of Systems   Constitutional: Negative for unexpected weight change.   Respiratory: Negative for shortness of breath.    Cardiovascular: Negative for chest pain.       Objective     /82 (BP Location: Left arm, Patient Position: Sitting, Cuff Size: Adult)   Pulse 57   Temp 97.1 °F (36.2 °C) (Infrared)   Ht 177.8 cm (70\")   Wt 111 kg (245 lb)   SpO2 99%   BMI 35.15 kg/m²     Physical Exam  Vitals reviewed.   Constitutional:       Appearance: Normal appearance.   Cardiovascular:      Rate and Rhythm: Normal rate and regular rhythm.      Heart sounds: Normal heart sounds.   Pulmonary:      Effort: Pulmonary effort is normal.      Breath sounds: " Normal breath sounds.   Neurological:      Mental Status: He is alert.             IMPRESSION/PLAN:    Assessment & Plan      Problem List Items Addressed This Visit        Family History    Family history of esophageal cancer    Overview     Mother and father            Gastrointestinal Abdominal     History of adenomatous polyp of colon    Overview     Last colonoscopy April 2021 with a patent end-to-side ileocolonic anastomosis characterized by healthy mucosa.  1 hyperplastic polyp removed.  5-year recall given.         Elevated amylase and lipase - Primary    Overview     1/6/2023: Minimal elevations amylase 107, lipase 98         Relevant Orders    Amylase    Lipase    US Gallbladder    Esophageal dysphagia    Relevant Medications    omeprazole (priLOSEC) 20 MG capsule     Repeat labs to see where amylase and lipase are today.  Ultrasound of the gallbladder  Would recommend to his PCP to try to get triglycerides more in the normal range, definitely in the differential of causing elevation of amylase and lipase.  Depending on gallbladder ultrasound consider CT scan of the abdomen however I am going to hold at this time given the fact he is completely asymptomatic with no abdominal pain or nausea and vomiting    Endoscopy per Dr Ramos given strong family history of esophageal cancer and recent trouble swallowing we will repeat endoscopy just to be on the safe side.  Recommended continuing Prilosec daily.    ..The risks, benefits, and alternatives of endoscopy were reviewed with the patient today.  Risks including perforation, with or without dilation, possibly requiring surgery.  Additional risks include risk of bleeding.  There is also the risk of a drug reaction or problems with anesthesia.  This will be discussed with the further by the anesthesia team on the day of the procedure. The benefits include the diagnosis and management of disease of the upper digestive tract.  Alternatives to endoscopy include upper  GI series, laboratory testing, radiographic evaluation, or no intervention.  The patient verbalizes understanding and agrees.    In accordance with requirements under the Affordable Care Act, [facility] has provided pricing for all hospital services and items on each of its websites. However, a patient's actual cost may differ based on the services the patient receives to meet individual healthcare needs and based on the benefits provided under the patient’s insurance coverage.                Stacy Hardwick, APRN  03/13/23  10:56 CDT    Part of this note may be an electronic transcription/translation of spoken language to printed text.

## 2023-03-16 ENCOUNTER — HOSPITAL ENCOUNTER (OUTPATIENT)
Dept: ULTRASOUND IMAGING | Facility: HOSPITAL | Age: 68
Discharge: HOME | End: 2023-03-16
Admitting: NURSE PRACTITIONER
Payer: MEDICARE

## 2023-03-16 PROCEDURE — 76705 ECHO EXAM OF ABDOMEN: CPT

## 2023-03-27 ENCOUNTER — TELEPHONE (OUTPATIENT)
Dept: GASTROENTEROLOGY | Facility: CLINIC | Age: 68
End: 2023-03-27
Payer: MEDICARE

## 2023-03-27 ENCOUNTER — ANESTHESIA (OUTPATIENT)
Dept: GASTROENTEROLOGY | Facility: HOSPITAL | Age: 68
End: 2023-03-27
Payer: MEDICARE

## 2023-03-27 ENCOUNTER — HOSPITAL ENCOUNTER (OUTPATIENT)
Facility: HOSPITAL | Age: 68
Setting detail: HOSPITAL OUTPATIENT SURGERY
Discharge: HOME | End: 2023-03-27
Attending: INTERNAL MEDICINE | Admitting: INTERNAL MEDICINE
Payer: MEDICARE

## 2023-03-27 ENCOUNTER — ANESTHESIA EVENT (OUTPATIENT)
Dept: GASTROENTEROLOGY | Facility: HOSPITAL | Age: 68
End: 2023-03-27
Payer: MEDICARE

## 2023-03-27 VITALS
HEART RATE: 63 BPM | SYSTOLIC BLOOD PRESSURE: 142 MMHG | DIASTOLIC BLOOD PRESSURE: 92 MMHG | OXYGEN SATURATION: 96 % | HEIGHT: 70 IN | RESPIRATION RATE: 16 BRPM | TEMPERATURE: 97.6 F | WEIGHT: 246 LBS | BODY MASS INDEX: 35.22 KG/M2

## 2023-03-27 DIAGNOSIS — R13.19 ESOPHAGEAL DYSPHAGIA: ICD-10-CM

## 2023-03-27 PROBLEM — K21.00 GASTROESOPHAGEAL REFLUX DISEASE WITH ESOPHAGITIS WITHOUT HEMORRHAGE: Status: ACTIVE | Noted: 2022-04-26

## 2023-03-27 PROCEDURE — 43235 EGD DIAGNOSTIC BRUSH WASH: CPT | Performed by: INTERNAL MEDICINE

## 2023-03-27 PROCEDURE — 25010000002 PROPOFOL 10 MG/ML EMULSION: Performed by: NURSE ANESTHETIST, CERTIFIED REGISTERED

## 2023-03-27 RX ORDER — SODIUM CHLORIDE 9 MG/ML
500 INJECTION, SOLUTION INTRAVENOUS CONTINUOUS PRN
Status: DISCONTINUED | OUTPATIENT
Start: 2023-03-27 | End: 2023-03-27 | Stop reason: HOSPADM

## 2023-03-27 RX ORDER — SODIUM CHLORIDE 0.9 % (FLUSH) 0.9 %
10 SYRINGE (ML) INJECTION AS NEEDED
Status: DISCONTINUED | OUTPATIENT
Start: 2023-03-27 | End: 2023-03-27 | Stop reason: HOSPADM

## 2023-03-27 RX ORDER — OMEPRAZOLE 20 MG/1
40 CAPSULE, DELAYED RELEASE ORAL DAILY
Qty: 30 CAPSULE | Refills: 11 | Status: SHIPPED | OUTPATIENT
Start: 2023-03-27

## 2023-03-27 RX ORDER — LIDOCAINE HYDROCHLORIDE 10 MG/ML
0.5 INJECTION, SOLUTION EPIDURAL; INFILTRATION; INTRACAUDAL; PERINEURAL ONCE AS NEEDED
Status: DISCONTINUED | OUTPATIENT
Start: 2023-03-27 | End: 2023-03-27 | Stop reason: HOSPADM

## 2023-03-27 RX ORDER — LIDOCAINE HYDROCHLORIDE 20 MG/ML
INJECTION, SOLUTION EPIDURAL; INFILTRATION; INTRACAUDAL; PERINEURAL AS NEEDED
Status: DISCONTINUED | OUTPATIENT
Start: 2023-03-27 | End: 2023-03-27 | Stop reason: SURG

## 2023-03-27 RX ORDER — PROPOFOL 10 MG/ML
VIAL (ML) INTRAVENOUS AS NEEDED
Status: DISCONTINUED | OUTPATIENT
Start: 2023-03-27 | End: 2023-03-27 | Stop reason: SURG

## 2023-03-27 RX ADMIN — PROPOFOL INJECTABLE EMULSION 200 MG: 10 INJECTION, EMULSION INTRAVENOUS at 12:01

## 2023-03-27 RX ADMIN — SODIUM CHLORIDE 500 ML: 9 INJECTION, SOLUTION INTRAVENOUS at 10:55

## 2023-03-27 RX ADMIN — LIDOCAINE HYDROCHLORIDE 100 MG: 20 INJECTION, SOLUTION EPIDURAL; INFILTRATION; INTRACAUDAL; PERINEURAL at 12:01

## 2023-03-27 NOTE — TELEPHONE ENCOUNTER
Pt's wife, Patti, called and left me a  earlier about pt's Omeprazole script. She states insurance won't cover it for 2/day. After review of his chart-Dr. Ramos sent in script for Omeprazole 20mg, 2 po qd, #30-that quantity is wrong and for convenience it would be easier for him to just take the 40mg once daily. I called [facility]-spoke to Jennifer-she transferred me to the prescriber  as pharmacist wasn't available. I left  authorizing them to switch pt to Omeprazole 40mg, 1 po qd, #30, 11 refills.     I called Patti back and updated her-she tells me even though I have changed the script it still won't be covered. She tells me it hasn't been covered for a while and they have just been buying it OTC. I advised her the pharmacy would have to run it and once it was kicked out for a PA I would start working on that-until we get that resolved she will continue to give him OTC Omeprazole 2/daily. I will update her and pharmacy once I know more about PA-[facility] is supposed to run it and let me know something tomorrow.

## 2023-03-27 NOTE — ANESTHESIA POSTPROCEDURE EVALUATION
"Patient: Malvin Duggan    Procedure Summary     Date: 03/27/23 Room / Location: [facility] ENDOSCOPY 6 / [facility] ENDOSCOPY    Anesthesia Start: 1156 Anesthesia Stop: 1211    Procedure: ESOPHAGOGASTRODUODENOSCOPY WITH ANESTHESIA Diagnosis:       Esophageal dysphagia      (Esophageal dysphagia [R13.19])    Surgeons: Manuela Ramos MD Provider: Carmelo Delgado CRNA    Anesthesia Type: MAC ASA Status: 2          Anesthesia Type: MAC    Vitals  Vitals Value Taken Time   /100 03/27/23 1228   Temp     Pulse 68 03/27/23 1229   Resp 16 03/27/23 1225   SpO2 98 % 03/27/23 1229   Vitals shown include unvalidated device data.        Post Anesthesia Care and Evaluation    Patient location during evaluation: PHASE II  Patient participation: complete - patient participated  Level of consciousness: awake and awake and alert  Pain score: 0  Pain management: adequate    Airway patency: patent  Anesthetic complications: No anesthetic complications    Cardiovascular status: acceptable and stable  Respiratory status: acceptable and unassisted  Hydration status: acceptable    Comments: Blood pressure 142/92, pulse 63, temperature 97.6 °F (36.4 °C), temperature source Temporal, resp. rate 16, height 177.8 cm (70\"), weight 112 kg (246 lb), SpO2 96 %.      "

## 2023-03-27 NOTE — ANESTHESIA PREPROCEDURE EVALUATION
Anesthesia Evaluation     no history of anesthetic complications:  NPO Solid Status: > 8 hours  NPO Liquid Status: > 8 hours           Airway   Mallampati: I  TM distance: >3 FB  Neck ROM: full  No difficulty expected  Dental      Pulmonary    (-) not a smoker  Cardiovascular   Exercise tolerance: good (4-7 METS)    (+) hypertension, hyperlipidemia,   (-) CAD      Neuro/Psych  GI/Hepatic/Renal/Endo    (+)  GERD,  diabetes mellitus type 2, thyroid problem hypothyroidism    ROS Comment: Esophageal stenosis    Musculoskeletal     Abdominal    Substance History      OB/GYN          Other                        Anesthesia Plan    ASA 2     MAC     intravenous induction     Anesthetic plan, risks, benefits, and alternatives have been provided, discussed and informed consent has been obtained with: patient.        CODE STATUS:

## 2023-03-28 NOTE — TELEPHONE ENCOUNTER
I did receive a fax from [facility] today that the Omeprazole does need a PA-his plan will only pay for 90 days in a calendar year. I will start working on that authorization. I did call pt's wife, Patti, to update her-was unable to reach her so I left detailed VM updating her. I did advise on VM that they could call back with any questions/problems in the mean time.

## 2023-03-31 NOTE — TELEPHONE ENCOUNTER
I have been out of the office sick the last 2 days so I just submitted PA for Omeprazole through CoverMyMeds. I will update pt and pharmacy once I know more from insurance.

## 2023-07-24 ENCOUNTER — HOSPITAL ENCOUNTER (OUTPATIENT)
Dept: CT IMAGING | Facility: HOSPITAL | Age: 68
Discharge: HOME | End: 2023-07-24
Admitting: NURSE PRACTITIONER
Payer: MEDICARE

## 2023-07-24 DIAGNOSIS — R74.8 ELEVATED AMYLASE AND LIPASE: ICD-10-CM

## 2023-07-24 LAB — CREAT BLDA-MCNC: 1.2 MG/DL (ref 0.6–1.3)

## 2023-07-24 PROCEDURE — 25510000001 IOPAMIDOL 61 % SOLUTION: Performed by: NURSE PRACTITIONER

## 2023-07-24 PROCEDURE — 74177 CT ABD & PELVIS W/CONTRAST: CPT

## 2023-07-24 PROCEDURE — 82565 ASSAY OF CREATININE: CPT

## 2023-07-24 RX ADMIN — IOPAMIDOL 100 ML: 612 INJECTION, SOLUTION INTRAVENOUS at 10:25

## 2024-03-29 DIAGNOSIS — K21.00 GASTROESOPHAGEAL REFLUX DISEASE WITH ESOPHAGITIS WITHOUT HEMORRHAGE: Primary | ICD-10-CM

## 2024-03-29 RX ORDER — OMEPRAZOLE 40 MG/1
40 CAPSULE, DELAYED RELEASE ORAL DAILY
Qty: 90 CAPSULE | Refills: 0 | Status: SHIPPED | OUTPATIENT
Start: 2024-03-29

## 2024-03-29 NOTE — TELEPHONE ENCOUNTER
Rx Refill Note  Requested Prescriptions     Pending Prescriptions Disp Refills    omeprazole (priLOSEC) 40 MG capsule 90 capsule 1     Sig: Take 1 capsule by mouth Daily.      Last office visit with prescribing clinician: 7/17/2023 with Mari     Last telemedicine visit with prescribing clinician: Visit date not found     Next office visit with prescribing clinician: Visit date not found     I rec'd fax from [facility] requesting refills on pt's Omeprazole. He is due for yearly OV 7/2024-pended 1 90-day refill to Dr. aRmos to last until he can schedule appt. I placed note to pharmacy that he is due for OV in July.                           Would you like a call back once the refill request has been completed: [] Yes [] No    If the office needs to give you a call back, can they leave a voicemail: [] Yes [] No    Sara Lowery MA  03/29/24, 10:15 CDT   12-Jan-2002

## 2024-06-17 RX ORDER — PROCHLORPERAZINE 25 MG/1
SUPPOSITORY RECTAL
COMMUNITY

## 2024-06-17 RX ORDER — MONTELUKAST SODIUM 4 MG/1
1 TABLET, CHEWABLE ORAL 2 TIMES DAILY
COMMUNITY
End: 2024-06-18

## 2024-06-17 RX ORDER — TEMAZEPAM 15 MG/1
15 CAPSULE ORAL NIGHTLY PRN
COMMUNITY

## 2024-06-17 RX ORDER — CLOBETASOL PROPIONATE 0.5 MG/G
1 CREAM TOPICAL 2 TIMES DAILY
COMMUNITY

## 2024-06-18 ENCOUNTER — OFFICE VISIT (OUTPATIENT)
Dept: GASTROENTEROLOGY | Facility: CLINIC | Age: 69
End: 2024-06-18
Payer: MEDICARE

## 2024-06-18 VITALS
TEMPERATURE: 98 F | BODY MASS INDEX: 34.79 KG/M2 | SYSTOLIC BLOOD PRESSURE: 120 MMHG | HEIGHT: 70 IN | WEIGHT: 243 LBS | DIASTOLIC BLOOD PRESSURE: 78 MMHG | HEART RATE: 76 BPM | OXYGEN SATURATION: 97 %

## 2024-06-18 DIAGNOSIS — Z86.010 HISTORY OF ADENOMATOUS POLYP OF COLON: ICD-10-CM

## 2024-06-18 DIAGNOSIS — R19.7 DIARRHEA, UNSPECIFIED TYPE: Primary | ICD-10-CM

## 2024-06-18 PROCEDURE — 99214 OFFICE O/P EST MOD 30 MIN: CPT | Performed by: NURSE PRACTITIONER

## 2024-06-18 PROCEDURE — 1159F MED LIST DOCD IN RCRD: CPT | Performed by: NURSE PRACTITIONER

## 2024-06-18 PROCEDURE — 1160F RVW MEDS BY RX/DR IN RCRD: CPT | Performed by: NURSE PRACTITIONER

## 2024-06-18 RX ORDER — SILDENAFIL 100 MG/1
100 TABLET, FILM COATED ORAL SEE ADMIN INSTRUCTIONS
COMMUNITY
Start: 2024-06-11 | End: 2024-06-18 | Stop reason: ALTCHOICE

## 2024-06-18 RX ORDER — DIPHENOXYLATE HYDROCHLORIDE AND ATROPINE SULFATE 2.5; .025 MG/1; MG/1
1 TABLET ORAL AS NEEDED
COMMUNITY
Start: 2024-05-07

## 2024-06-18 NOTE — ASSESSMENT & PLAN NOTE
Begin liquid immodium 1/4-1/2 dose before each meal.  Try this for 3 months. And if no better plan colonoscopy.  Pt agrees with plan. Told him colonoscopy is needed to rule out microscopic colitis.

## 2024-06-18 NOTE — PROGRESS NOTES
"Primary Physician: Shantell Bowens,     Chief Complaint   Patient presents with    Diarrhea     Pt c/o diarrhea for over a year-states it is a daily issues and has \"pretty much ruined my life\"-will have multiple loose BM's/day and it is definitely worse after eating meals; Pt states he uses \"a lot\" of Imodium but it will bind him up and that also makes him miserable; Pt's last colon was 4/28/2021       Subjective     Malvin Duggan is a 68 y.o. male.    HPI  Diarrhea  Patient referred to our office for evaluation of diarrhea.  Pt having diarrhea for at least 1 year. At times it is daily.   He is using Immodium which does help but will bind him to much    No weight loss, no abd pain and no rectal bleeding.    Patient has been given Colestid 1 g twice daily by his PCP.  This was started on 4/3/2024.    4/28/2021 most recent colonoscopy: Patent end-to-side ileocolonic anastomosis characterized by healthy mucosa.  1 small polyp of the rectum resected.    3/27/2023 last upper endoscopy: LA grade B reflux esophagitis, normal stomach, normal appearing duodenum, no specimens collected.      Personal history of colon polyps  Last colonoscopy April 2021 with a patent end-to-side ileocolonic anastomosis characterized by healthy mucosa.  1 hyperplastic polyp removed.  5-year recall given.      Family history of esophageal cancer  His mother and father both had esophageal cancer.  Last endoscopy 3/27/2023: LA grade B reflux esophagitis with no bleeding, stomach normal, duodenum normal.  Dr. Ramos recommended Prilosec 40 mg in the morning moving forward.   NO trouble swallowing at this time.  Pt doing well with NO UGI complaints.    Past Medical History:   Diagnosis Date    Black lung disease     GERD (gastroesophageal reflux disease)     History of colon polyps     Hypercholesterolemia     Hypertension     Hypothyroidism     Leukocytosis 08/23/2017    Migraines     Neck nodule 08/23/2017    Type 2 diabetes mellitus  "       Past Surgical History:   Procedure Laterality Date    CARDIAC CATHETERIZATION      COLECTOMY PARTIAL / TOTAL Right 2015    For large polyp that was in wall per pt    COLONOSCOPY  11/28/2016    Dr. Aleman-Small 0.3cm polyp near anastamosis-path shows fragment of benign small intestinal mucosa; Otherwise normal; Repeat 5 years    COLONOSCOPY N/A 04/28/2021    Patent end-to-side ileo-colonic anastomosis-Characterized by healthy appearing mucosa; One 4mm hyperplastic polyp in the rectum; Repeat 5 years    ELBOW ARTHROSCOPY      ENDOSCOPY N/A 04/27/2022    Normal esophagus; Normal stomach; Normal examined duodenum; No specimens collected    ENDOSCOPY N/A 03/27/2023    LA Grade B reflux esophagitis with no bleeding; Normal stomach; Normal examined duodenum; No specimens collected    FOOT SURGERY Right     SHOULDER ARTHROTOMY      THUMB ARTHROSCOPY  2013    Tendon repair    WRIST SURGERY      Chipped bone        Current Outpatient Medications:     atorvastatin (LIPITOR) 20 MG tablet, Take 1 tablet by mouth Daily., Disp: , Rfl:     clobetasol propionate (TEMOVATE) 0.05 % cream, Apply 1 Application topically to the appropriate area as directed 2 (Two) Times a Day., Disp: , Rfl:     Continuous Glucose Transmitter (Dexcom G6 Transmitter) misc, Use., Disp: , Rfl:     cyclobenzaprine (FLEXERIL) 10 MG tablet, Take 1 tablet by mouth At Night As Needed for Muscle Spasms., Disp: , Rfl:     diphenoxylate-atropine (LOMOTIL) 2.5-0.025 MG per tablet, Take 1 tablet by mouth As Needed., Disp: , Rfl:     Dupilumab 300 MG/2ML solution pen-injector, Inject 2 mL under the skin into the appropriate area as directed Every 14 (Fourteen) Days., Disp: , Rfl:     eletriptan (RELPAX) 40 MG tablet, Take 1 tablet by mouth 1 (One) Time As Needed for Migraine. may repeat in 2 hours if necessary, Disp: , Rfl:     glipiZIDE 5 MG tablet 5 mg, metFORMIN 500 MG tablet 500 mg, Take 2 doses by mouth 2 (Two) Times a Day Before Meals., Disp: , Rfl:      levothyroxine (SYNTHROID, LEVOTHROID) 75 MCG tablet, Take 1 tablet by mouth Daily., Disp: , Rfl:     lisinopril (PRINIVIL,ZESTRIL) 40 MG tablet, Take 0.5 tablets by mouth Daily., Disp: 30 tablet, Rfl: 0    NovoLIN R 100 UNIT/ML injection, Inject  under the skin into the appropriate area as directed As Needed., Disp: , Rfl:     omeprazole (priLOSEC) 40 MG capsule, Take 1 capsule by mouth Daily., Disp: 90 capsule, Rfl: 0    temazepam (RESTORIL) 15 MG capsule, Take 1 capsule by mouth At Night As Needed for Sleep., Disp: , Rfl:     traMADol (ULTRAM) 50 MG tablet, Take 1 tablet by mouth Every 8 (Eight) Hours As Needed for Moderate Pain., Disp: , Rfl:     Tresiba FlexTouch 200 UNIT/ML solution pen-injector pen injection, Inject  under the skin into the appropriate area as directed Daily., Disp: , Rfl:     triamterene-hydrochlorothiazide (MAXZIDE-25) 37.5-25 MG per tablet, Take 1 tablet by mouth Daily., Disp: , Rfl:     Allergies   Allergen Reactions    Doxycycline Anaphylaxis and Swelling    Jardiance [Empagliflozin] Other (See Comments)     Renal failure    Victoza [Liraglutide] Other (See Comments)     Pancreatitis       Social History     Socioeconomic History    Marital status:    Tobacco Use    Smoking status: Never    Smokeless tobacco: Never   Vaping Use    Vaping status: Never Used   Substance and Sexual Activity    Alcohol use: Yes     Comment: Occasionally-Less than once a week-Maybe one beer a month    Drug use: No    Sexual activity: Yes     Partners: Female     Comment: Old age       Family History   Problem Relation Age of Onset    Esophageal cancer Mother     Esophageal cancer Father     Heart disease Sister     Colon cancer Neg Hx     Colon polyps Neg Hx     Liver cancer Neg Hx     Liver disease Neg Hx     Stomach cancer Neg Hx     Rectal cancer Neg Hx        Review of Systems   Constitutional:  Negative for unexpected weight change.   Gastrointestinal:  Positive for diarrhea. Negative for abdominal  "pain and blood in stool.       Objective     /78 (BP Location: Left arm, Patient Position: Sitting, Cuff Size: Adult)   Pulse 76   Temp 98 °F (36.7 °C) (Infrared)   Ht 177.8 cm (70\")   Wt 110 kg (243 lb)   SpO2 97%   BMI 34.87 kg/m²     Physical Exam  Vitals reviewed.   Constitutional:       Appearance: Normal appearance.   Abdominal:      General: Abdomen is flat. There is no distension.      Palpations: Abdomen is soft.      Tenderness: There is no abdominal tenderness. There is no guarding.   Neurological:      Mental Status: He is alert.         Lab Results - Last 18 Months   Lab Units 07/24/23  1023   CREATININE mg/dL 1.20           IMPRESSION/PLAN:    Assessment & Plan      Problem List Items Addressed This Visit       History of adenomatous polyp of colon    Overview     Last colonoscopy April 2021 with a patent end-to-side ileocolonic anastomosis characterized by healthy mucosa.  1 hyperplastic polyp removed.  5-year recall given.         Diarrhea - Primary    Overview     Diarrhea, which is nearly daily unless he takes Immodium.  If he uses Immodium it can lead to constipation    4/28/2021 most recent colonoscopy: Patent end-to-side ileocolonic anastomosis characterized by healthy mucosa.  1 small polyp of the rectum resected.         Current Assessment & Plan     Begin liquid immodium 1/4-1/2 dose before each meal.  Try this for 3 months. And if no better plan colonoscopy.  Pt agrees with plan. Told him colonoscopy is needed to rule out microscopic colitis.          3 month follow up                    Stacy Hardwick, JOSUÉ  06/18/24  09:07 CDT    Part of this note may be an electronic transcription/translation of spoken language to printed text.      "

## 2024-06-26 DIAGNOSIS — K21.00 GASTROESOPHAGEAL REFLUX DISEASE WITH ESOPHAGITIS WITHOUT HEMORRHAGE: ICD-10-CM

## 2024-06-26 RX ORDER — OMEPRAZOLE 40 MG/1
40 CAPSULE, DELAYED RELEASE ORAL DAILY
Qty: 90 CAPSULE | Refills: 0 | Status: SHIPPED | OUTPATIENT
Start: 2024-06-26

## 2024-06-26 NOTE — TELEPHONE ENCOUNTER
Rx Refill Note  Requested Prescriptions     Pending Prescriptions Disp Refills    omeprazole (priLOSEC) 40 MG capsule [Pharmacy Med Name: OMEPRAZOLE DR 40 MG CAPSULE] 90 capsule 0     Sig: TAKE 1 CAPSULE BY MOUTH EVERY DAY      Last office visit with prescribing clinician: 6/18/2024 with Mari     Last telemedicine visit with prescribing clinician: Visit date not found     Next office visit with prescribing clinician: 9/16/2024 with Mari    Pt was just seen in office and has upcoming appt with Mari-pended refills to Dr. Ramos.                            Would you like a call back once the refill request has been completed: [] Yes [] No    If the office needs to give you a call back, can they leave a voicemail: [] Yes [] No    Sara Lowery MA  06/26/24, 12:41 CDT

## 2024-09-16 ENCOUNTER — OFFICE VISIT (OUTPATIENT)
Dept: GASTROENTEROLOGY | Facility: CLINIC | Age: 69
End: 2024-09-16
Payer: MEDICARE

## 2024-09-16 VITALS
OXYGEN SATURATION: 98 % | HEIGHT: 70 IN | WEIGHT: 237 LBS | SYSTOLIC BLOOD PRESSURE: 110 MMHG | TEMPERATURE: 97.7 F | HEART RATE: 56 BPM | BODY MASS INDEX: 33.93 KG/M2 | DIASTOLIC BLOOD PRESSURE: 74 MMHG

## 2024-09-16 DIAGNOSIS — R19.7 DIARRHEA, UNSPECIFIED TYPE: Primary | ICD-10-CM

## 2024-09-16 PROCEDURE — 1160F RVW MEDS BY RX/DR IN RCRD: CPT | Performed by: NURSE PRACTITIONER

## 2024-09-16 PROCEDURE — 99214 OFFICE O/P EST MOD 30 MIN: CPT | Performed by: NURSE PRACTITIONER

## 2024-09-16 PROCEDURE — 1159F MED LIST DOCD IN RCRD: CPT | Performed by: NURSE PRACTITIONER

## 2024-09-17 DIAGNOSIS — K21.00 GASTROESOPHAGEAL REFLUX DISEASE WITH ESOPHAGITIS WITHOUT HEMORRHAGE: ICD-10-CM

## 2024-09-18 ENCOUNTER — TELEPHONE (OUTPATIENT)
Dept: GASTROENTEROLOGY | Facility: CLINIC | Age: 69
End: 2024-09-18
Payer: MEDICARE

## 2024-09-19 RX ORDER — OMEPRAZOLE 40 MG/1
40 CAPSULE, DELAYED RELEASE ORAL DAILY
Qty: 90 CAPSULE | Refills: 3 | Status: SHIPPED | OUTPATIENT
Start: 2024-09-19

## 2024-09-20 ENCOUNTER — HOSPITAL ENCOUNTER (OUTPATIENT)
Facility: HOSPITAL | Age: 69
Setting detail: HOSPITAL OUTPATIENT SURGERY
Discharge: HOME | End: 2024-09-20
Attending: INTERNAL MEDICINE | Admitting: INTERNAL MEDICINE
Payer: MEDICARE

## 2024-09-20 ENCOUNTER — ANESTHESIA EVENT (OUTPATIENT)
Dept: GASTROENTEROLOGY | Facility: HOSPITAL | Age: 69
End: 2024-09-20
Payer: MEDICARE

## 2024-09-20 ENCOUNTER — ANESTHESIA (OUTPATIENT)
Dept: GASTROENTEROLOGY | Facility: HOSPITAL | Age: 69
End: 2024-09-20
Payer: MEDICARE

## 2024-09-20 VITALS
TEMPERATURE: 96.4 F | DIASTOLIC BLOOD PRESSURE: 81 MMHG | HEIGHT: 70 IN | SYSTOLIC BLOOD PRESSURE: 133 MMHG | BODY MASS INDEX: 33.07 KG/M2 | WEIGHT: 231 LBS | OXYGEN SATURATION: 97 % | RESPIRATION RATE: 18 BRPM | HEART RATE: 74 BPM

## 2024-09-20 DIAGNOSIS — R19.7 DIARRHEA, UNSPECIFIED TYPE: ICD-10-CM

## 2024-09-20 LAB — GLUCOSE BLDC GLUCOMTR-MCNC: 146 MG/DL (ref 70–130)

## 2024-09-20 PROCEDURE — 88305 TISSUE EXAM BY PATHOLOGIST: CPT | Performed by: INTERNAL MEDICINE

## 2024-09-20 PROCEDURE — 45385 COLONOSCOPY W/LESION REMOVAL: CPT | Performed by: INTERNAL MEDICINE

## 2024-09-20 PROCEDURE — 25010000002 PROPOFOL 1000 MG/100ML EMULSION: Performed by: NURSE ANESTHETIST, CERTIFIED REGISTERED

## 2024-09-20 PROCEDURE — 82948 REAGENT STRIP/BLOOD GLUCOSE: CPT

## 2024-09-20 PROCEDURE — 25810000003 SODIUM CHLORIDE 0.9 % SOLUTION: Performed by: ANESTHESIOLOGY

## 2024-09-20 PROCEDURE — 45380 COLONOSCOPY AND BIOPSY: CPT | Performed by: INTERNAL MEDICINE

## 2024-09-20 RX ORDER — LIDOCAINE HYDROCHLORIDE 20 MG/ML
INJECTION, SOLUTION EPIDURAL; INFILTRATION; INTRACAUDAL; PERINEURAL AS NEEDED
Status: DISCONTINUED | OUTPATIENT
Start: 2024-09-20 | End: 2024-09-20 | Stop reason: SURG

## 2024-09-20 RX ORDER — SODIUM CHLORIDE 0.9 % (FLUSH) 0.9 %
10 SYRINGE (ML) INJECTION AS NEEDED
Status: DISCONTINUED | OUTPATIENT
Start: 2024-09-20 | End: 2024-09-20 | Stop reason: HOSPADM

## 2024-09-20 RX ORDER — PROPOFOL 10 MG/ML
INJECTION, EMULSION INTRAVENOUS AS NEEDED
Status: DISCONTINUED | OUTPATIENT
Start: 2024-09-20 | End: 2024-09-20 | Stop reason: SURG

## 2024-09-20 RX ORDER — SODIUM CHLORIDE 9 MG/ML
500 INJECTION, SOLUTION INTRAVENOUS CONTINUOUS PRN
Status: DISCONTINUED | OUTPATIENT
Start: 2024-09-20 | End: 2024-09-20 | Stop reason: HOSPADM

## 2024-09-20 RX ADMIN — PROPOFOL 100 MG: 10 INJECTION, EMULSION INTRAVENOUS at 09:51

## 2024-09-20 RX ADMIN — PROPOFOL 100 MG: 10 INJECTION, EMULSION INTRAVENOUS at 09:42

## 2024-09-20 RX ADMIN — LIDOCAINE HYDROCHLORIDE 50 MG: 20 INJECTION, SOLUTION EPIDURAL; INFILTRATION; INTRACAUDAL; PERINEURAL at 09:42

## 2024-09-20 RX ADMIN — SODIUM CHLORIDE 500 ML: 9 INJECTION, SOLUTION INTRAVENOUS at 09:11

## 2024-09-20 RX ADMIN — PROPOFOL 100 MG: 10 INJECTION, EMULSION INTRAVENOUS at 09:47

## 2024-09-24 LAB
CYTO UR: NORMAL
LAB AP CASE REPORT: NORMAL
Lab: NORMAL
PATH REPORT.FINAL DX SPEC: NORMAL
PATH REPORT.GROSS SPEC: NORMAL

## 2024-09-27 ENCOUNTER — TELEPHONE (OUTPATIENT)
Dept: GASTROENTEROLOGY | Facility: CLINIC | Age: 69
End: 2024-09-27
Payer: MEDICARE

## 2024-09-27 RX ORDER — METRONIDAZOLE 500 MG/1
500 TABLET ORAL 3 TIMES DAILY
Qty: 21 TABLET | Refills: 0 | Status: SHIPPED | OUTPATIENT
Start: 2024-09-27 | End: 2024-10-04

## 2025-02-03 ENCOUNTER — TRANSCRIBE ORDERS (OUTPATIENT)
Dept: ADMINISTRATIVE | Facility: HOSPITAL | Age: 70
End: 2025-02-03
Payer: MEDICARE

## 2025-02-03 DIAGNOSIS — K58.0 IRRITABLE BOWEL SYNDROME WITH DIARRHEA: Primary | ICD-10-CM

## 2025-02-04 ENCOUNTER — LAB (OUTPATIENT)
Dept: LAB | Facility: HOSPITAL | Age: 70
End: 2025-02-04
Payer: MEDICARE

## 2025-02-04 DIAGNOSIS — K58.0 IRRITABLE BOWEL SYNDROME WITH DIARRHEA: ICD-10-CM

## 2025-02-04 LAB — C DIFF TOX GENS STL QL NAA+PROBE: NEGATIVE

## 2025-02-04 PROCEDURE — 87329 GIARDIA AG IA: CPT

## 2025-02-04 PROCEDURE — 87177 OVA AND PARASITES SMEARS: CPT

## 2025-02-04 PROCEDURE — 87493 C DIFF AMPLIFIED PROBE: CPT

## 2025-02-04 PROCEDURE — 87209 SMEAR COMPLEX STAIN: CPT

## 2025-02-04 PROCEDURE — 87046 STOOL CULTR AEROBIC BACT EA: CPT

## 2025-02-04 PROCEDURE — 87045 FECES CULTURE AEROBIC BACT: CPT

## 2025-02-04 PROCEDURE — 87427 SHIGA-LIKE TOXIN AG IA: CPT

## 2025-02-05 LAB
G LAMBLIA AG STL QL IA: NEGATIVE
O+P SPEC MICRO: NORMAL
O+P STL CONC: NORMAL

## 2025-02-08 LAB
BACTERIA SPEC CULT: NORMAL
BACTERIA SPEC CULT: NORMAL
CAMPYLOBACTER STL CULT: NORMAL
E COLI SXT STL QL IA: NEGATIVE
SALM + SHIG STL CULT: NORMAL

## 2025-03-19 ENCOUNTER — HOSPITAL ENCOUNTER (OUTPATIENT)
Dept: GENERAL RADIOLOGY | Facility: HOSPITAL | Age: 70
Discharge: HOME | End: 2025-03-19
Admitting: FAMILY MEDICINE
Payer: MEDICARE

## 2025-03-19 ENCOUNTER — TRANSCRIBE ORDERS (OUTPATIENT)
Dept: ADMINISTRATIVE | Facility: HOSPITAL | Age: 70
End: 2025-03-19
Payer: MEDICARE

## 2025-03-19 DIAGNOSIS — M25.512 LEFT SHOULDER PAIN, UNSPECIFIED CHRONICITY: ICD-10-CM

## 2025-03-19 DIAGNOSIS — M25.512 LEFT SHOULDER PAIN, UNSPECIFIED CHRONICITY: Primary | ICD-10-CM

## 2025-03-19 PROCEDURE — 73030 X-RAY EXAM OF SHOULDER: CPT

## 2025-03-24 ENCOUNTER — OFFICE VISIT (OUTPATIENT)
Age: 70
End: 2025-03-24
Payer: MEDICARE

## 2025-03-24 VITALS — HEIGHT: 70 IN | BODY MASS INDEX: 34.5 KG/M2 | WEIGHT: 241 LBS

## 2025-03-24 DIAGNOSIS — M75.112 NONTRAUMATIC INCOMPLETE TEAR OF LEFT ROTATOR CUFF: Primary | ICD-10-CM

## 2025-03-24 PROCEDURE — 1159F MED LIST DOCD IN RCRD: CPT

## 2025-03-24 PROCEDURE — 99203 OFFICE O/P NEW LOW 30 MIN: CPT

## 2025-03-24 PROCEDURE — 3017F COLORECTAL CA SCREEN DOC REV: CPT

## 2025-03-24 PROCEDURE — 1123F ACP DISCUSS/DSCN MKR DOCD: CPT

## 2025-03-24 PROCEDURE — 1160F RVW MEDS BY RX/DR IN RCRD: CPT

## 2025-03-24 PROCEDURE — G8427 DOCREV CUR MEDS BY ELIG CLIN: HCPCS

## 2025-03-24 PROCEDURE — G8417 CALC BMI ABV UP PARAM F/U: HCPCS

## 2025-03-24 PROCEDURE — 1036F TOBACCO NON-USER: CPT

## 2025-03-24 RX ORDER — TRIAMTERENE AND HYDROCHLOROTHIAZIDE 37.5; 25 MG/1; MG/1
CAPSULE ORAL DAILY
COMMUNITY
Start: 2025-03-12

## 2025-03-24 RX ORDER — LEVOTHYROXINE SODIUM 88 UG/1
88 TABLET ORAL DAILY
COMMUNITY
Start: 2025-02-20

## 2025-03-24 RX ORDER — OMEPRAZOLE 40 MG/1
40 CAPSULE, DELAYED RELEASE ORAL DAILY
COMMUNITY
Start: 2024-09-19

## 2025-03-24 RX ORDER — TRAMADOL HYDROCHLORIDE 50 MG/1
50 TABLET ORAL EVERY 8 HOURS PRN
COMMUNITY

## 2025-03-24 RX ORDER — ELETRIPTAN HYDROBROMIDE 40 MG/1
40 TABLET, FILM COATED ORAL
COMMUNITY

## 2025-03-24 RX ORDER — MELOXICAM 15 MG/1
15 TABLET ORAL DAILY
Qty: 30 TABLET | Refills: 0 | Status: SHIPPED | OUTPATIENT
Start: 2025-03-24

## 2025-03-24 RX ORDER — CYCLOBENZAPRINE HCL 10 MG
10 TABLET ORAL NIGHTLY PRN
COMMUNITY

## 2025-03-24 NOTE — PROGRESS NOTES
Orthopaedic History and Physical - New Patient    NAME:  Jose Guadalupe Lopes   : 1955  MRN: [id number]    3/24/2025     CHIEF COMPLAINT:  left shoulder pain    HISTORY OF PRESENT ILLNESS:   The patient is a 69 y.o. right hand dominant male who presents to the office for evaluation and treatment of left shoulder pain over the past several months without injury.  Pain is described as achy, throbbing, burning, associated with decreased range of motion and strength worse with daily activities including mowing.  He also endorses night pain.  Treatment has consisted of over-the-counter anti-inflammatories, stretches and exercises on his own.  Pain is rated a 7/10.    Past Medical History:        Diagnosis Date    Diabetes mellitus (HCC)     Hyperlipidemia     Hypertension     Thyroid disease        Past Surgical History:        Procedure Laterality Date    CARPAL TUNNEL RELEASE Left     COLON SURGERY      removed a huge polyp    COLONOSCOPY  2016    Dr Cartwright--open access-mucosa--5 yr recall w/Dr Diaz    ELBOW SURGERY Left     ROTATOR CUFF REPAIR Right     THUMB AMPUTATION      thumb surgery, not amputation       Current Medications:   Prior to Admission medications    Medication Sig Start Date End Date Taking? Authorizing Provider   levothyroxine (SYNTHROID) 88 MCG tablet Take 1 tablet by mouth daily 25  Yes Eugenie Peña MD   cyclobenzaprine (FLEXERIL) 10 MG tablet Take 1 tablet by mouth nightly as needed   Yes Eugenie Peña MD   eletriptan (RELPAX) 40 MG tablet Take 1 tablet by mouth once as needed   Yes Eugenie Peña MD   omeprazole (PRILOSEC) 40 MG delayed release capsule Take 1 capsule by mouth daily 24  Yes Eugenie Peña MD   traMADol (ULTRAM) 50 MG tablet Take 1 tablet by mouth every 8 hours as needed.   Yes Eugenie Peña MD   triamterene-hydroCHLOROthiazide (DYAZIDE) 37.5-25 MG per capsule Take by mouth daily 3/12/25  Yes Eugenie Peña MD

## 2025-03-31 ENCOUNTER — TELEPHONE (OUTPATIENT)
Age: 70
End: 2025-03-31

## 2025-03-31 NOTE — TELEPHONE ENCOUNTER
Called patient, let them know that the MRI has been authorized and I went ahead and faxed the order and referral with the authorization in it over to [facility] for him also.

## 2025-04-03 ENCOUNTER — TRANSCRIBE ORDERS (OUTPATIENT)
Dept: ADMINISTRATIVE | Facility: HOSPITAL | Age: 70
End: 2025-04-03
Payer: MEDICARE

## 2025-04-03 DIAGNOSIS — M75.112 NONTRAUMATIC INCOMPLETE TEAR OF LEFT ROTATOR CUFF: Primary | ICD-10-CM

## 2025-04-03 NOTE — TELEPHONE ENCOUNTER
Called [facility] to verify if they received the orders and referral for patient's MRI. They did not receive the order and referral that I faxed on Monday.    Sunday April 13th, 2025 1:00 PM. Arrive at 12:30 PM.  Go to ED go to the security window to get checked in. I will re-print and fax the order and referral to the confirmed fax number that she gave me which is [phone].

## 2025-04-03 NOTE — TELEPHONE ENCOUNTER
Pt wife called and said that they have yet to call her to get his mri she said to fax orders to fax [phone]

## 2025-04-03 NOTE — TELEPHONE ENCOUNTER
I have contacted patient and let him know of the appointment information. They are okay with this. I have faxed over the orders and referral and received confirmation that the fax went through.   no

## 2025-04-07 NOTE — PROGRESS NOTES
neuropathy.    5.  Nonspecific edema in the posterior aspect of the inferior  glenohumeral ligament complex which could be seen with a sprain;  however, my suspicion is that this could be related to adhesive  capsulitis.    6.  In addition, there is grade 3 fatty infiltration of teres minor.  Chronic axillary neuropathy to be considered.    This report was signed and finalized on 4/14/2025 8:34 AM by Dr. Edwardo Flores MD.    MRI images of the left shoulder as well as the impression taken from [facility] read today/14/2025 were reviewed by myself in clinic today.  Images show mild rotator cuff tendinopathy without evidence of rotator cuff tearing or retraction.  No evidence of rotator cuff muscle atrophy.  Biceps tendinopathy present.  Nonspecific edema noted in the IGHL complex consistent with adhesive capsulitis.    --------------------------------------------------------------------------------------------------------------------    Assessment:   Encounter Diagnosis   Name Primary?    Adhesive capsulitis of left shoulder associated with type 2 diabetes mellitus Yes        Plan:    Return for 6 weeks L shoulder follow up no xray.   We recommend conservative treatment initially.  We recommend physical therapy, he would like to do this here at our facility.  He is going to attempt to wean off of the meloxicam.  He may use ice and topicals as needed.  We will hold off on any steroids in the setting of diabetes.  We did briefly discuss shoulder manipulation under anesthesia with a Toradol injection if he fails to improve with conservative treatment.  Return to clinic in 6 weeks for reevaluation.  All questions were answered.     Electronically signed by Rebeka Hernandez PA-C on 4/14/2025 at 11:15 AM

## 2025-04-13 ENCOUNTER — HOSPITAL ENCOUNTER (OUTPATIENT)
Dept: MRI IMAGING | Facility: HOSPITAL | Age: 70
Discharge: HOME | End: 2025-04-13
Payer: MEDICARE

## 2025-04-13 DIAGNOSIS — M75.112 NONTRAUMATIC INCOMPLETE TEAR OF LEFT ROTATOR CUFF: ICD-10-CM

## 2025-04-13 PROCEDURE — 73221 MRI JOINT UPR EXTREM W/O DYE: CPT

## 2025-04-14 ENCOUNTER — OFFICE VISIT (OUTPATIENT)
Age: 70
End: 2025-04-14
Payer: MEDICARE

## 2025-04-14 VITALS — WEIGHT: 243 LBS | HEIGHT: 70 IN | BODY MASS INDEX: 34.79 KG/M2

## 2025-04-14 DIAGNOSIS — M75.02 ADHESIVE CAPSULITIS OF LEFT SHOULDER ASSOCIATED WITH TYPE 2 DIABETES MELLITUS: Primary | ICD-10-CM

## 2025-04-14 DIAGNOSIS — E11.618 ADHESIVE CAPSULITIS OF LEFT SHOULDER ASSOCIATED WITH TYPE 2 DIABETES MELLITUS: Primary | ICD-10-CM

## 2025-04-14 PROCEDURE — 3046F HEMOGLOBIN A1C LEVEL >9.0%: CPT

## 2025-04-14 PROCEDURE — 2022F DILAT RTA XM EVC RTNOPTHY: CPT

## 2025-04-14 PROCEDURE — 1036F TOBACCO NON-USER: CPT

## 2025-04-14 PROCEDURE — 3017F COLORECTAL CA SCREEN DOC REV: CPT

## 2025-04-14 PROCEDURE — 99213 OFFICE O/P EST LOW 20 MIN: CPT

## 2025-04-14 PROCEDURE — 1160F RVW MEDS BY RX/DR IN RCRD: CPT

## 2025-04-14 PROCEDURE — G8427 DOCREV CUR MEDS BY ELIG CLIN: HCPCS

## 2025-04-14 PROCEDURE — 1123F ACP DISCUSS/DSCN MKR DOCD: CPT

## 2025-04-14 PROCEDURE — 1159F MED LIST DOCD IN RCRD: CPT

## 2025-04-14 PROCEDURE — G8417 CALC BMI ABV UP PARAM F/U: HCPCS

## 2025-05-19 ENCOUNTER — OFFICE VISIT (OUTPATIENT)
Age: 70
End: 2025-05-19
Payer: MEDICARE

## 2025-05-19 VITALS — HEIGHT: 70 IN | WEIGHT: 243 LBS | BODY MASS INDEX: 34.79 KG/M2

## 2025-05-19 DIAGNOSIS — M75.02 ADHESIVE CAPSULITIS OF LEFT SHOULDER ASSOCIATED WITH TYPE 2 DIABETES MELLITUS (HCC): Primary | ICD-10-CM

## 2025-05-19 DIAGNOSIS — E11.618 ADHESIVE CAPSULITIS OF LEFT SHOULDER ASSOCIATED WITH TYPE 2 DIABETES MELLITUS (HCC): Primary | ICD-10-CM

## 2025-05-19 PROCEDURE — 1159F MED LIST DOCD IN RCRD: CPT

## 2025-05-19 PROCEDURE — G8417 CALC BMI ABV UP PARAM F/U: HCPCS

## 2025-05-19 PROCEDURE — 3017F COLORECTAL CA SCREEN DOC REV: CPT

## 2025-05-19 PROCEDURE — 99213 OFFICE O/P EST LOW 20 MIN: CPT

## 2025-05-19 PROCEDURE — 1036F TOBACCO NON-USER: CPT

## 2025-05-19 PROCEDURE — 3046F HEMOGLOBIN A1C LEVEL >9.0%: CPT

## 2025-05-19 PROCEDURE — 20610 DRAIN/INJ JOINT/BURSA W/O US: CPT

## 2025-05-19 PROCEDURE — G8427 DOCREV CUR MEDS BY ELIG CLIN: HCPCS

## 2025-05-19 PROCEDURE — 1123F ACP DISCUSS/DSCN MKR DOCD: CPT

## 2025-05-19 PROCEDURE — 1160F RVW MEDS BY RX/DR IN RCRD: CPT

## 2025-05-19 PROCEDURE — 2022F DILAT RTA XM EVC RTNOPTHY: CPT

## 2025-05-19 RX ORDER — LIDOCAINE HYDROCHLORIDE 10 MG/ML
2 INJECTION, SOLUTION INFILTRATION; PERINEURAL ONCE
Status: COMPLETED | OUTPATIENT
Start: 2025-05-19 | End: 2025-05-19

## 2025-05-19 RX ORDER — BUPIVACAINE HYDROCHLORIDE 2.5 MG/ML
2 INJECTION, SOLUTION INFILTRATION; PERINEURAL ONCE
Status: COMPLETED | OUTPATIENT
Start: 2025-05-19 | End: 2025-05-19

## 2025-05-19 RX ORDER — TRIAMCINOLONE ACETONIDE 40 MG/ML
40 INJECTION, SUSPENSION INTRA-ARTICULAR; INTRAMUSCULAR ONCE
Status: COMPLETED | OUTPATIENT
Start: 2025-05-19 | End: 2025-05-19

## 2025-05-19 RX ADMIN — LIDOCAINE HYDROCHLORIDE 2 ML: 10 INJECTION, SOLUTION INFILTRATION; PERINEURAL at 10:02

## 2025-05-19 RX ADMIN — TRIAMCINOLONE ACETONIDE 40 MG: 40 INJECTION, SUSPENSION INTRA-ARTICULAR; INTRAMUSCULAR at 10:03

## 2025-05-19 RX ADMIN — BUPIVACAINE HYDROCHLORIDE 5 MG: 2.5 INJECTION, SOLUTION INFILTRATION; PERINEURAL at 10:01

## 2025-05-19 NOTE — PROGRESS NOTES
Orthopaedic Clinic Note - Established Patient    NAME:  Jose Guadalupe Lopes   : 1955  MRN: [id number]    2025    CHIEF COMPLAINT:  follow up for left shoulder pain    HISTORY OF PRESENT ILLNESS:   The patient is a 69 y.o. type II diabetic male who returns today for follow up of left shoulder pain.  We reviewed MRI at last visit with recommendations for physical therapy. He has been completing this here at our facility. Since last visit, range of motion has improved but pain has remained. He reports difficulty sleeping at night due to shoulder pain.       Past Medical History:        Diagnosis Date    Diabetes mellitus (HCC)     Hyperlipidemia     Hypertension     Thyroid disease        Past Surgical History:        Procedure Laterality Date    CARPAL TUNNEL RELEASE Left     COLON SURGERY      removed a huge polyp    COLONOSCOPY  2016    Dr Cartwright--open access-mucosa--5 yr recall w/Dr Diaz    ELBOW SURGERY Left     ROTATOR CUFF REPAIR Right     THUMB AMPUTATION      thumb surgery, not amputation       Current Medications:   Prior to Admission medications    Medication Sig Start Date End Date Taking? Authorizing Provider   levothyroxine (SYNTHROID) 88 MCG tablet Take 1 tablet by mouth daily 25  Yes ProviderEugenie MD   cyclobenzaprine (FLEXERIL) 10 MG tablet Take 1 tablet by mouth nightly as needed   Yes Provider, MD Eugenie   eletriptan (RELPAX) 40 MG tablet Take 1 tablet by mouth once as needed   Yes Provider, MD Eugenie   omeprazole (PRILOSEC) 40 MG delayed release capsule Take 1 capsule by mouth daily 24  Yes Provider, MD Eugenie   traMADol (ULTRAM) 50 MG tablet Take 1 tablet by mouth every 8 hours as needed.   Yes ProviderEugenie MD   triamterene-hydroCHLOROthiazide (DYAZIDE) 37.5-25 MG per capsule Take by mouth daily 3/12/25  Yes Provider, MD Eugenie   meloxicam (MOBIC) 15 MG tablet Take 1 tablet by mouth daily Take one (1) tablet by oral route every day

## 2025-06-19 NOTE — PROGRESS NOTES
Orthopaedic Clinic Note - Established Patient    NAME:  Jose Guadalupe Lopes   : 1955  MRN: [id number]    2025    CHIEF COMPLAINT:  follow up for left shoulder pain    HISTORY OF PRESENT ILLNESS:   The patient is a 69 y.o. male with type II diabetes who returns today for follow up of  left shoulder pain. He has been diagnosed with adhesive capsulitis and has been working with PT downstairs at our facility.  Since last visit, pain has greatly improved. He recently returned from a cruise to [place] and [place] and has been using the shoulder without significant pain or limitations. He has been able to sleep now that pain has resolved.    Past Medical History:        Diagnosis Date    Diabetes mellitus (HCC)     Hyperlipidemia     Hypertension     Thyroid disease        Past Surgical History:        Procedure Laterality Date    CARPAL TUNNEL RELEASE Left     COLON SURGERY      removed a huge polyp    COLONOSCOPY  2016    Dr Cartwright--open access-mucosa--5 yr recall w/Dr Diaz    ELBOW SURGERY Left     ROTATOR CUFF REPAIR Right     THUMB AMPUTATION      thumb surgery, not amputation       Current Medications:   Prior to Admission medications    Medication Sig Start Date End Date Taking? Authorizing Provider   levothyroxine (SYNTHROID) 88 MCG tablet Take 1 tablet by mouth daily 25  Yes ProviderEugenie MD   cyclobenzaprine (FLEXERIL) 10 MG tablet Take 1 tablet by mouth nightly as needed   Yes ProviderEugenie MD   eletriptan (RELPAX) 40 MG tablet Take 1 tablet by mouth once as needed   Yes ProviderEugenie MD   omeprazole (PRILOSEC) 40 MG delayed release capsule Take 1 capsule by mouth daily 24  Yes ProviderEugenie MD   traMADol (ULTRAM) 50 MG tablet Take 1 tablet by mouth every 8 hours as needed.   Yes ProviderEugenie MD   triamterene-hydroCHLOROthiazide (DYAZIDE) 37.5-25 MG per capsule Take by mouth daily 3/12/25  Yes ProviderEugenie MD   meloxicam (MOBIC) 15 MG

## 2025-06-23 ENCOUNTER — OFFICE VISIT (OUTPATIENT)
Age: 70
End: 2025-06-23
Payer: MEDICARE

## 2025-06-23 VITALS — HEIGHT: 70 IN | WEIGHT: 244 LBS | BODY MASS INDEX: 34.93 KG/M2

## 2025-06-23 DIAGNOSIS — M75.02 ADHESIVE CAPSULITIS OF LEFT SHOULDER ASSOCIATED WITH TYPE 2 DIABETES MELLITUS (HCC): Primary | ICD-10-CM

## 2025-06-23 DIAGNOSIS — E11.618 ADHESIVE CAPSULITIS OF LEFT SHOULDER ASSOCIATED WITH TYPE 2 DIABETES MELLITUS (HCC): Primary | ICD-10-CM

## 2025-06-23 PROCEDURE — 1123F ACP DISCUSS/DSCN MKR DOCD: CPT

## 2025-06-23 PROCEDURE — 1159F MED LIST DOCD IN RCRD: CPT

## 2025-06-23 PROCEDURE — 3017F COLORECTAL CA SCREEN DOC REV: CPT

## 2025-06-23 PROCEDURE — G8427 DOCREV CUR MEDS BY ELIG CLIN: HCPCS

## 2025-06-23 PROCEDURE — G8417 CALC BMI ABV UP PARAM F/U: HCPCS

## 2025-06-23 PROCEDURE — 1160F RVW MEDS BY RX/DR IN RCRD: CPT

## 2025-06-23 PROCEDURE — 99213 OFFICE O/P EST LOW 20 MIN: CPT

## 2025-06-23 PROCEDURE — 2022F DILAT RTA XM EVC RTNOPTHY: CPT

## 2025-06-23 PROCEDURE — 1036F TOBACCO NON-USER: CPT

## 2025-06-23 PROCEDURE — 3046F HEMOGLOBIN A1C LEVEL >9.0%: CPT

## 2025-08-12 ENCOUNTER — OFFICE VISIT (OUTPATIENT)
Age: 70
End: 2025-08-12

## 2025-08-12 VITALS — BODY MASS INDEX: 36.36 KG/M2 | WEIGHT: 254 LBS | HEIGHT: 70 IN

## 2025-08-12 DIAGNOSIS — M75.02 ADHESIVE CAPSULITIS OF LEFT SHOULDER ASSOCIATED WITH TYPE 2 DIABETES MELLITUS (HCC): Primary | ICD-10-CM

## 2025-08-12 DIAGNOSIS — E11.618 ADHESIVE CAPSULITIS OF LEFT SHOULDER ASSOCIATED WITH TYPE 2 DIABETES MELLITUS (HCC): Primary | ICD-10-CM

## 2025-08-12 DIAGNOSIS — M19.012 PRIMARY OSTEOARTHRITIS OF LEFT SHOULDER: ICD-10-CM

## 2025-08-12 RX ORDER — ROPIVACAINE HYDROCHLORIDE 5 MG/ML
2 INJECTION, SOLUTION EPIDURAL; INFILTRATION; PERINEURAL ONCE
Status: COMPLETED | OUTPATIENT
Start: 2025-08-12 | End: 2025-08-12

## 2025-08-12 RX ORDER — LIDOCAINE HYDROCHLORIDE 10 MG/ML
2 INJECTION, SOLUTION INFILTRATION; PERINEURAL ONCE
Status: COMPLETED | OUTPATIENT
Start: 2025-08-12 | End: 2025-08-12

## 2025-08-12 RX ORDER — TRIAMCINOLONE ACETONIDE 40 MG/ML
40 INJECTION, SUSPENSION INTRA-ARTICULAR; INTRAMUSCULAR ONCE
Status: COMPLETED | OUTPATIENT
Start: 2025-08-12 | End: 2025-08-12

## 2025-08-12 RX ADMIN — LIDOCAINE HYDROCHLORIDE 2 ML: 10 INJECTION, SOLUTION INFILTRATION; PERINEURAL at 09:53

## 2025-08-12 RX ADMIN — ROPIVACAINE HYDROCHLORIDE 2 ML: 5 INJECTION, SOLUTION EPIDURAL; INFILTRATION; PERINEURAL at 09:55

## 2025-08-12 RX ADMIN — TRIAMCINOLONE ACETONIDE 40 MG: 40 INJECTION, SUSPENSION INTRA-ARTICULAR; INTRAMUSCULAR at 09:56

## (undated) DEVICE — Device: Brand: SINGLE USE ELECTROSURGICAL SNARE SD-400

## (undated) DEVICE — CUFF,BP,DISP,1 TUBE,ADULT,HP: Brand: MEDLINE

## (undated) DEVICE — YANKAUER,BULB TIP WITH VENT: Brand: ARGYLE

## (undated) DEVICE — TBG SMPL FLTR LINE NASL 02/C02 A/ BX/100

## (undated) DEVICE — THE CHANNEL CLEANING BRUSH IS A NYLON FLEXI BRUSH ATTACHED TO A FLEXIBLE PLASTIC SHEATH DESIGNED TO SAFELY REMOVE DEBRIS FROM FLEXIBLE ENDOSCOPES.

## (undated) DEVICE — CONMED SCOPE SAVER BITE BLOCK, 20X27 MM: Brand: SCOPE SAVER

## (undated) DEVICE — Device: Brand: DEFENDO AIR/WATER/SUCTION AND BIOPSY VALVE

## (undated) DEVICE — THE SINGLE USE ETRAP – POLYP TRAP IS USED FOR SUCTION RETRIEVAL OF ENDOSCOPICALLY REMOVED POLYPS.: Brand: ETRAP

## (undated) DEVICE — MASK,OXYGEN,MED CONC,ADLT,7' TUB, UC: Brand: PENDING

## (undated) DEVICE — SENSR O2 OXIMAX FNGR A/ 18IN NONSTR

## (undated) DEVICE — SNAR POLYP SENSATION MICRO OVL 13 240X40

## (undated) DEVICE — FRCP BX RADJAW4 NDL 2.8 240 STD OG